# Patient Record
Sex: MALE | Race: OTHER | HISPANIC OR LATINO | Employment: FULL TIME | URBAN - METROPOLITAN AREA
[De-identification: names, ages, dates, MRNs, and addresses within clinical notes are randomized per-mention and may not be internally consistent; named-entity substitution may affect disease eponyms.]

---

## 2023-08-06 ENCOUNTER — HOSPITAL ENCOUNTER (EMERGENCY)
Facility: HOSPITAL | Age: 38
Discharge: HOME | End: 2023-08-06
Attending: STUDENT IN AN ORGANIZED HEALTH CARE EDUCATION/TRAINING PROGRAM
Payer: COMMERCIAL

## 2023-08-06 VITALS
SYSTOLIC BLOOD PRESSURE: 143 MMHG | RESPIRATION RATE: 18 BRPM | TEMPERATURE: 98.5 F | DIASTOLIC BLOOD PRESSURE: 84 MMHG | OXYGEN SATURATION: 98 % | HEART RATE: 94 BPM | WEIGHT: 280 LBS | HEIGHT: 71 IN | BODY MASS INDEX: 39.2 KG/M2

## 2023-08-06 DIAGNOSIS — G43.909 MIGRAINE: Primary | ICD-10-CM

## 2023-08-06 PROCEDURE — 96372 THER/PROPH/DIAG INJ SC/IM: CPT

## 2023-08-06 PROCEDURE — 99284 EMERGENCY DEPT VISIT MOD MDM: CPT | Performed by: STUDENT IN AN ORGANIZED HEALTH CARE EDUCATION/TRAINING PROGRAM

## 2023-08-06 PROCEDURE — 99282 EMERGENCY DEPT VISIT SF MDM: CPT

## 2023-08-06 RX ORDER — KETOROLAC TROMETHAMINE 30 MG/ML
15 INJECTION, SOLUTION INTRAMUSCULAR; INTRAVENOUS ONCE
Status: COMPLETED | OUTPATIENT
Start: 2023-08-06 | End: 2023-08-06

## 2023-08-06 RX ORDER — ACETAMINOPHEN 325 MG/1
975 TABLET ORAL ONCE
Status: COMPLETED | OUTPATIENT
Start: 2023-08-06 | End: 2023-08-06

## 2023-08-06 RX ADMIN — ACETAMINOPHEN 975 MG: 325 TABLET ORAL at 17:52

## 2023-08-06 RX ADMIN — KETOROLAC TROMETHAMINE 15 MG: 30 INJECTION, SOLUTION INTRAMUSCULAR at 17:52

## 2023-08-06 NOTE — ED PROVIDER NOTES
History  Chief Complaint   Patient presents with   • Migraine     Migraine since this morning, did not take any meds      Patient is a 37-year-old male, past medical history including migraines, who presents to the emergency department for a migraine. Started this morning. Starts around his right eye and moves back and down his neck. Similar to prior migraines. Has tried Motrin earlier this a.m. without relief. Pain is also worse with light. No other modifying factors. No associated symptoms. Denies any dizziness. No neck pain or stiffness. No chest pain or shortness of breath. No other complaints or concerns. None       Past Medical History:   Diagnosis Date   • Psychiatric disorder        No past surgical history on file. No family history on file. I have reviewed and agree with the history as documented. E-Cigarette/Vaping     E-Cigarette/Vaping Substances     Social History     Tobacco Use   • Smoking status: Never   • Smokeless tobacco: Never   Substance Use Topics   • Alcohol use: Never   • Drug use: Never       Review of Systems   Constitutional: Negative for chills and fever. Eyes: Positive for photophobia. Gastrointestinal: Negative for nausea and vomiting. Neurological: Positive for headaches. All other systems reviewed and are negative. Physical Exam  Physical Exam  Vitals and nursing note reviewed. Constitutional:       General: He is not in acute distress. Appearance: He is well-developed. He is not diaphoretic. HENT:      Head: Normocephalic and atraumatic. Right Ear: External ear normal.      Left Ear: External ear normal.      Nose: Nose normal.   Eyes:      General: Lids are normal. No scleral icterus. Cardiovascular:      Rate and Rhythm: Normal rate and regular rhythm. Heart sounds: Normal heart sounds. No murmur heard. No friction rub. No gallop. Pulmonary:      Effort: Pulmonary effort is normal. No respiratory distress. Breath sounds: Normal breath sounds. No wheezing or rales. Abdominal:      Palpations: Abdomen is soft. Tenderness: There is no abdominal tenderness. There is no guarding or rebound. Musculoskeletal:         General: No deformity. Normal range of motion. Cervical back: Normal range of motion and neck supple. Skin:     General: Skin is warm and dry. Neurological:      General: No focal deficit present. Mental Status: He is alert and oriented to person, place, and time. GCS: GCS eye subscore is 4. GCS verbal subscore is 5. GCS motor subscore is 6. Cranial Nerves: Cranial nerves 2-12 are intact. No cranial nerve deficit or facial asymmetry. Sensory: Sensation is intact. No sensory deficit. Motor: Motor function is intact. No pronator drift. Coordination: Coordination is intact. Finger-Nose-Finger Test normal. Rapid alternating movements normal.      Gait: Gait is intact.  Gait normal.   Psychiatric:         Mood and Affect: Mood normal.         Behavior: Behavior normal.         Vital Signs  ED Triage Vitals   Temperature Pulse Respirations Blood Pressure SpO2   08/06/23 1737 08/06/23 1739 08/06/23 1739 08/06/23 1737 08/06/23 1739   98.5 °F (36.9 °C) 94 18 143/84 98 %      Temp Source Heart Rate Source Patient Position - Orthostatic VS BP Location FiO2 (%)   08/06/23 1737 08/06/23 1739 08/06/23 1737 08/06/23 1737 --   Tympanic Monitor Sitting Right arm       Pain Score       08/06/23 1737       5           Vitals:    08/06/23 1737 08/06/23 1739   BP: 143/84    Pulse:  94   Patient Position - Orthostatic VS: Sitting          Visual Acuity      ED Medications  Medications   acetaminophen (TYLENOL) tablet 975 mg (975 mg Oral Given 8/6/23 1752)   ketorolac (TORADOL) injection 15 mg (15 mg Intramuscular Given 8/6/23 1752)       Diagnostic Studies  Results Reviewed     None                 No orders to display              Procedures  Procedures         ED Course  ED Course as of 08/06/23 1842   Sun Aug 06, 2023   2074 Patient reevaluated. Resting comfortably. States pain is improved to a 3 out of 10. Offered further pain medications but patient declined. As there is no indication for further work-up or treatment in the emergency department at this time will discharge. Recommended neurology follow-up. Number provided. Return precautions discussed. Patient verbalized understanding and agreed to plan of care. Medical Decision Making  Patient is a 41 y.o. male who presents to the ED for a headache. Patient is nontoxic, well-appearing. Vitals are stable. Physical exam is unremarkable. Presentation most consistent with migraine headache. Differential diagnosis includes tension type headache. No headache red flags. Neurologic exam without evidence of meningismus or focal neurologic findings. Presentation not consistent with acute SAH (lack of risk factors, has headache history). Presentation not consistent with acute CNS infection, including meningitis or brain abscess. Presentation not consistent with temporal arteritis or acute angle closure glaucoma. Presentation not consistent with other acute, emergent causes of headache at this time. No indication for imaging/LP at this time. Plan: pain medication, serial reassessment, likely d/c                 Portions of the record may have been created with voice recognition software. Occasional wrong word or "sound a like" substitutions may have occurred due to the inherent limitations of voice recognition software. Read the chart carefully and recognize, using context, where substitutions have occurred. Risk  OTC drugs. Prescription drug management.           Disposition  Final diagnoses:   Migraine     Time reflects when diagnosis was documented in both MDM as applicable and the Disposition within this note     Time User Action Codes Description Comment    8/6/2023  6:34 PM Paige Valderrama [address] [D60.922] Migraine       ED Disposition     ED Disposition   Discharge    Condition   Stable    Date/Time   Sun Aug 6, 2023  6:34 PM    [address] discharge to home/self care. Follow-up Information     Follow up With Specialties Details Why Contact Info Additional Information    Infolink  Call in 1 day  120 [facility] Emergency Department Emergency Medicine   [address] [address]  1060 [facility] Emergency Department, [address] [facility] [address] , [facility], 2303 [facility] Schedule an appointment as soon as possible for a visit   [address] [address]  [facility], [address] [facility], [address] [phone]          Patient's Medications    No medications on file       No discharge procedures on file.     PDMP Review     None          ED Provider  Electronically Signed by           José Anne DO  08/06/23 0097

## 2023-08-06 NOTE — DISCHARGE INSTRUCTIONS
You have been evaluated in the Emergency Department today for headache. Your evaluation did not show evidence of medical conditions requiring emergent intervention at this time, and your pain improved with medication in the ED. You can take ibuprofen every 6 hours or tylenol every 6 hours as needed for pain. Please follow up with your primary care physician within two days. It is also recommended follow-up with neurology. Call to schedule appointment    Return to the Emergency Department if you experience worsening or uncontrolled pain, vision changes, recurrent vomiting, difficulty with normal activities, abnormal behavior, difficulty walking, numbness, weakness, or any other concerning symptoms.

## 2023-08-06 NOTE — Clinical Note
Pat Bob was seen and treated in our emergency department on 8/6/2023. No restrictions            Diagnosis:     Michelle Reyna  may return to work on return date. He may return on this date: 08/07/2023         If you have any questions or concerns, please don't hesitate to call.       Eliseo Cabral, DO    ______________________________           _______________          _______________  Hospital Representative                              Date                                Time

## 2023-08-14 ENCOUNTER — HOSPITAL ENCOUNTER (EMERGENCY)
Facility: HOSPITAL | Age: 38
Discharge: HOME | End: 2023-08-14
Attending: STUDENT IN AN ORGANIZED HEALTH CARE EDUCATION/TRAINING PROGRAM
Payer: COMMERCIAL

## 2023-08-14 VITALS
RESPIRATION RATE: 22 BRPM | HEART RATE: 96 BPM | SYSTOLIC BLOOD PRESSURE: 127 MMHG | DIASTOLIC BLOOD PRESSURE: 83 MMHG | TEMPERATURE: 97.7 F | OXYGEN SATURATION: 96 %

## 2023-08-14 DIAGNOSIS — R51.9 HEADACHE: Primary | ICD-10-CM

## 2023-08-14 PROCEDURE — 96375 TX/PRO/DX INJ NEW DRUG ADDON: CPT

## 2023-08-14 PROCEDURE — 99284 EMERGENCY DEPT VISIT MOD MDM: CPT | Performed by: STUDENT IN AN ORGANIZED HEALTH CARE EDUCATION/TRAINING PROGRAM

## 2023-08-14 PROCEDURE — NC001 PR NO CHARGE: Performed by: STUDENT IN AN ORGANIZED HEALTH CARE EDUCATION/TRAINING PROGRAM

## 2023-08-14 PROCEDURE — 96365 THER/PROPH/DIAG IV INF INIT: CPT

## 2023-08-14 PROCEDURE — 99282 EMERGENCY DEPT VISIT SF MDM: CPT

## 2023-08-14 PROCEDURE — 96361 HYDRATE IV INFUSION ADD-ON: CPT

## 2023-08-14 RX ORDER — METOCLOPRAMIDE HYDROCHLORIDE 5 MG/ML
10 INJECTION INTRAMUSCULAR; INTRAVENOUS ONCE
Status: COMPLETED | OUTPATIENT
Start: 2023-08-14 | End: 2023-08-14

## 2023-08-14 RX ORDER — ESCITALOPRAM OXALATE 20 MG/1
20 TABLET ORAL DAILY
COMMUNITY

## 2023-08-14 RX ORDER — KETOROLAC TROMETHAMINE 30 MG/ML
15 INJECTION, SOLUTION INTRAMUSCULAR; INTRAVENOUS ONCE
Status: COMPLETED | OUTPATIENT
Start: 2023-08-14 | End: 2023-08-14

## 2023-08-14 RX ORDER — MAGNESIUM SULFATE HEPTAHYDRATE 40 MG/ML
2 INJECTION, SOLUTION INTRAVENOUS ONCE
Status: COMPLETED | OUTPATIENT
Start: 2023-08-14 | End: 2023-08-14

## 2023-08-14 RX ORDER — DIPHENHYDRAMINE HYDROCHLORIDE 50 MG/ML
25 INJECTION INTRAMUSCULAR; INTRAVENOUS ONCE
Status: COMPLETED | OUTPATIENT
Start: 2023-08-14 | End: 2023-08-14

## 2023-08-14 RX ADMIN — DIPHENHYDRAMINE HYDROCHLORIDE 25 MG: 50 INJECTION, SOLUTION INTRAMUSCULAR; INTRAVENOUS at 18:15

## 2023-08-14 RX ADMIN — MAGNESIUM SULFATE HEPTAHYDRATE 2 G: 40 INJECTION, SOLUTION INTRAVENOUS at 18:43

## 2023-08-14 RX ADMIN — SODIUM CHLORIDE 1000 ML: 0.9 INJECTION, SOLUTION INTRAVENOUS at 18:12

## 2023-08-14 RX ADMIN — KETOROLAC TROMETHAMINE 15 MG: 30 INJECTION, SOLUTION INTRAMUSCULAR at 18:14

## 2023-08-14 RX ADMIN — DIPHENHYDRAMINE HYDROCHLORIDE 25 MG: 50 INJECTION, SOLUTION INTRAMUSCULAR; INTRAVENOUS at 18:42

## 2023-08-14 RX ADMIN — METOCLOPRAMIDE 10 MG: 5 INJECTION, SOLUTION INTRAMUSCULAR; INTRAVENOUS at 18:15

## 2023-08-14 NOTE — ED NOTES
Pt reported feeling anxious and jittery. MD made aware, HR WNL, resp is non labored. To give another benadryl.       Satinder Barnard RN  08/14/23 4192

## 2023-08-14 NOTE — Clinical Note
Ann Luciana was seen and treated in our emergency department on 8/14/2023. No restrictions            Diagnosis:     Valetta Heimlich  may return to work on return date. He may return on this date: 08/16/2023         If you have any questions or concerns, please don't hesitate to call.       Gregoria Gonzalez, DO    ______________________________           _______________          _______________  Hospital Representative                              Date                                Time

## 2023-08-14 NOTE — DISCHARGE INSTRUCTIONS
You have been evaluated in the Emergency Department today for headache. Your evaluation did not show evidence of medical conditions requiring emergent intervention at this time, and your pain improved with medication in the ED. You can take ibuprofen every 6 hours or tylenol every 6 hours as needed for pain. Please follow up with your primary care physician within two days. It is also recommended you follow up with neurology. Please call to schedule an appointment. Return to the Emergency Department if you experience worsening or uncontrolled pain, vision changes, recurrent vomiting, difficulty with normal activities, abnormal behavior, difficulty walking, numbness, weakness, or any other concerning symptoms.

## 2023-08-14 NOTE — Clinical Note
Krupa Ramos was seen and treated in our emergency department on 8/14/2023. No restrictions            Diagnosis:     Balaji Nagel  may return to work on return date. He may return on this date: 08/16/2023         If you have any questions or concerns, please don't hesitate to call.       Myra Cedillo, DO    ______________________________           _______________          _______________  Hospital Representative                              Date                                Time

## 2023-08-15 NOTE — ED PROVIDER NOTES
History  Chief Complaint   Patient presents with   • Headache     Here about a week ago for migraine. States seemed to get better and back today     Patient is a 59-year-old male, past medical history of headaches, who presents to the emergency department for a headache. Started this morning. States the pain is all over. Identical to his prior headaches. Not thunderclap. Has tried Tylenol without relief. No other modifying factors. Associated with photophobia and nausea. States he was seen here recently for an identical headache and the pain ultimately resolved after that visit. Has not followed up with neurology. No fevers or chills. No other complaints or concerns. Prior to Admission Medications   Prescriptions Last Dose Informant Patient Reported? Taking? FLUTICASONE FUROATE IN   Yes Yes   Sig: Inhale if needed   Nutritional Supplements (VITAMIN D BOOSTER PO)   Yes Yes   Sig: Take by mouth in the morning   escitalopram (LEXAPRO) 20 mg tablet   Yes Yes   Sig: Take 20 mg by mouth daily      Facility-Administered Medications: None       Past Medical History:   Diagnosis Date   • Psychiatric disorder        History reviewed. No pertinent surgical history. History reviewed. No pertinent family history. I have reviewed and agree with the history as documented. E-Cigarette/Vaping     E-Cigarette/Vaping Substances     Social History     Tobacco Use   • Smoking status: Never   • Smokeless tobacco: Never   Substance Use Topics   • Alcohol use: Never   • Drug use: Never       Review of Systems   Constitutional: Negative for chills and fever. Eyes: Positive for photophobia. Neurological: Positive for headaches. Negative for dizziness. All other systems reviewed and are negative. Physical Exam  Physical Exam  Vitals and nursing note reviewed. Constitutional:       General: He is not in acute distress. Appearance: He is well-developed. He is not diaphoretic.    HENT:      Head: Normocephalic and atraumatic. Right Ear: External ear normal.      Left Ear: External ear normal.      Nose: Nose normal.   Eyes:      General: Lids are normal. No scleral icterus. Cardiovascular:      Rate and Rhythm: Normal rate and regular rhythm. Heart sounds: Normal heart sounds. No murmur heard. No friction rub. No gallop. Pulmonary:      Effort: Pulmonary effort is normal. No respiratory distress. Breath sounds: Normal breath sounds. No wheezing or rales. Abdominal:      Palpations: Abdomen is soft. Tenderness: There is no abdominal tenderness. There is no guarding or rebound. Musculoskeletal:         General: No deformity. Normal range of motion. Cervical back: Normal range of motion and neck supple. Skin:     General: Skin is warm and dry. Neurological:      General: No focal deficit present. Mental Status: He is alert.    Psychiatric:         Mood and Affect: Mood normal.         Behavior: Behavior normal.         Vital Signs  ED Triage Vitals [08/14/23 1731]   Temperature Pulse Respirations Blood Pressure SpO2   97.7 °F (36.5 °C) 96 22 127/83 96 %      Temp Source Heart Rate Source Patient Position - Orthostatic VS BP Location FiO2 (%)   Tympanic Monitor Sitting Left arm --      Pain Score       10 - Worst Possible Pain           Vitals:    08/14/23 1731   BP: 127/83   Pulse: 96   Patient Position - Orthostatic VS: Sitting         Visual Acuity      ED Medications  Medications   ketorolac (TORADOL) injection 15 mg (15 mg Intravenous Given 8/14/23 1814)   metoclopramide (REGLAN) injection 10 mg (10 mg Intravenous Given 8/14/23 1815)   diphenhydrAMINE (BENADRYL) injection 25 mg (25 mg Intravenous Given 8/14/23 1815)   magnesium sulfate 2 g/50 mL IVPB (premix) 2 g (0 g Intravenous Stopped 8/14/23 1957)   sodium chloride 0.9 % bolus 1,000 mL (0 mL Intravenous Stopped 8/14/23 2009)   diphenhydrAMINE (BENADRYL) injection 25 mg (25 mg Intravenous Given 8/14/23 1842) Diagnostic Studies  Results Reviewed     None                 No orders to display              Procedures  Procedures         ED Course  ED Course as of 08/14/23 2045   [facility] Aug 14, 2023   1920 Patient reevaluated. Resting comfortably. States pain is improved to a 2 out of 10. States he would like to go home. As there is no indication for further work-up or treatment in the emergency department at this time will discharge. Discussed neurology follow-up soon as possible given recurrence of his headaches. Return precautions discussed. Patient verbalized understanding and agreed to plan of care. Medical Decision Making  Patient is a 41 y.o. male who presents to the ED for a headache. Patient is nontoxic, well-appearing. Vitals are stable. Physical exam is unremarkable. Presentation most consistent with migraine. Differential diagnosis includes tension type headache. No headache red flags. Neurologic exam without evidence of meningismus or focal neurologic findings. Presentation not consistent with acute SAH (lack of risk factors, has headache history). Presentation not consistent with acute CNS infection, including meningitis or brain abscess. Presentation not consistent with temporal arteritis or acute angle closure glaucoma. Presentation not consistent with other acute, emergent causes of headache at this time. No indication for imaging/LP at this time. Plan: pain medication, serial reassessment, likely d/c                 Portions of the record may have been created with voice recognition software. Occasional wrong word or "sound a like" substitutions may have occurred due to the inherent limitations of voice recognition software. Read the chart carefully and recognize, using context, where substitutions have occurred. Headache: acute illness or injury  Risk  Prescription drug management.           Disposition  Final diagnoses:   Headache     Time reflects when diagnosis was documented in both MDM as applicable and the Disposition within this note     Time User Action Codes Description Comment    8/14/2023  7:26 PM Angeles Campos Add [R51.9] Headache       ED Disposition     ED Disposition   Discharge    Condition   Stable    Date/Time   Mon Aug 14, 2023  7:25 PM    [address] discharge to home/self care. Follow-up Information     Follow up With Specialties Details Why Contact Info Additional Information    Infolink  Call in 1 day  [facility] Emergency Department Emergency Medicine   [address] [address]  1060 [facility] Emergency Department, [address] [facility] [address] [place] [address], 2303 [facility] Neurology Schedule an appointment as soon as possible for a visit   [address] [address]  [facility] Neurology [address] [place] [address] Rd, [address] [facility], [address] [facility]          Discharge Medication List as of 8/14/2023  7:29 PM      CONTINUE these medications which have NOT CHANGED    Details   escitalopram (LEXAPRO) 20 mg tablet Take 20 mg by mouth daily, Historical Med      FLUTICASONE FUROATE IN Inhale if needed, Historical Med      Nutritional Supplements (VITAMIN D BOOSTER PO) Take by mouth in the morning, Historical Med             No discharge procedures on file.     PDMP Review     None          ED Provider  Electronically Signed by           Raina Dean DO  08/14/23 2045

## 2023-08-28 ENCOUNTER — HOSPITAL ENCOUNTER (EMERGENCY)
Facility: HOSPITAL | Age: 38
Discharge: HOME | End: 2023-08-28
Attending: STUDENT IN AN ORGANIZED HEALTH CARE EDUCATION/TRAINING PROGRAM
Payer: COMMERCIAL

## 2023-08-28 ENCOUNTER — APPOINTMENT (EMERGENCY)
Dept: RADIOLOGY | Facility: HOSPITAL | Age: 38
End: 2023-08-28
Payer: COMMERCIAL

## 2023-08-28 VITALS
HEART RATE: 87 BPM | TEMPERATURE: 97.8 F | BODY MASS INDEX: 39.05 KG/M2 | OXYGEN SATURATION: 98 % | WEIGHT: 280 LBS | DIASTOLIC BLOOD PRESSURE: 90 MMHG | RESPIRATION RATE: 18 BRPM | SYSTOLIC BLOOD PRESSURE: 163 MMHG

## 2023-08-28 DIAGNOSIS — M79.642 PAIN IN BOTH HANDS: Primary | ICD-10-CM

## 2023-08-28 DIAGNOSIS — M79.641 PAIN IN BOTH HANDS: Primary | ICD-10-CM

## 2023-08-28 PROCEDURE — 73130 X-RAY EXAM OF HAND: CPT

## 2023-08-28 PROCEDURE — 99284 EMERGENCY DEPT VISIT MOD MDM: CPT | Performed by: STUDENT IN AN ORGANIZED HEALTH CARE EDUCATION/TRAINING PROGRAM

## 2023-08-28 PROCEDURE — 99283 EMERGENCY DEPT VISIT LOW MDM: CPT

## 2023-08-28 RX ORDER — NAPROXEN 500 MG/1
500 TABLET ORAL ONCE
Status: COMPLETED | OUTPATIENT
Start: 2023-08-28 | End: 2023-08-28

## 2023-08-28 RX ORDER — NAPROXEN 500 MG/1
500 TABLET ORAL 2 TIMES DAILY WITH MEALS
Qty: 14 TABLET | Refills: 0 | Status: SHIPPED | OUTPATIENT
Start: 2023-08-28 | End: 2023-09-04

## 2023-08-28 RX ADMIN — NAPROXEN 500 MG: 500 TABLET ORAL at 18:15
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
## 2023-08-28 NOTE — Clinical Note
Emily Neumann was seen and treated in our emergency department on 8/28/2023. Diagnosis:     Branden Larkin  . He may return on this date: 08/29/2023         If you have any questions or concerns, please don't hesitate to call.       Diaz Jordan, DO    ______________________________           _______________          _______________  Hospital Representative                              Date                                Time Left arm;

## 2023-08-28 NOTE — DISCHARGE INSTRUCTIONS
You were seen in the emergency department for bilateral hand pain. As discussed please follow-up with hand surgery. Call to schedule an appointment. Please take naproxen as prescribed. Return to the emergency room for any new or concerning symptoms.

## 2023-09-11 ENCOUNTER — APPOINTMENT (EMERGENCY)
Dept: RADIOLOGY | Facility: HOSPITAL | Age: 38
End: 2023-09-11
Payer: OTHER MISCELLANEOUS

## 2023-09-11 ENCOUNTER — HOSPITAL ENCOUNTER (EMERGENCY)
Facility: HOSPITAL | Age: 38
Discharge: HOME | End: 2023-09-11
Attending: EMERGENCY MEDICINE
Payer: OTHER MISCELLANEOUS

## 2023-09-11 VITALS
DIASTOLIC BLOOD PRESSURE: 72 MMHG | WEIGHT: 280 LBS | OXYGEN SATURATION: 98 % | SYSTOLIC BLOOD PRESSURE: 131 MMHG | RESPIRATION RATE: 18 BRPM | BODY MASS INDEX: 39.05 KG/M2 | HEART RATE: 93 BPM | TEMPERATURE: 98.7 F

## 2023-09-11 DIAGNOSIS — M79.671 RIGHT FOOT PAIN: Primary | ICD-10-CM

## 2023-09-11 PROCEDURE — 73630 X-RAY EXAM OF FOOT: CPT

## 2023-09-11 PROCEDURE — 99284 EMERGENCY DEPT VISIT MOD MDM: CPT | Performed by: EMERGENCY MEDICINE

## 2023-09-11 PROCEDURE — 99283 EMERGENCY DEPT VISIT LOW MDM: CPT

## 2023-09-11 RX ORDER — NAPROXEN 500 MG/1
500 TABLET ORAL ONCE
Status: COMPLETED | OUTPATIENT
Start: 2023-09-11 | End: 2023-09-11

## 2023-09-11 RX ORDER — NAPROXEN 500 MG/1
500 TABLET ORAL 2 TIMES DAILY WITH MEALS
Qty: 30 TABLET | Refills: 0 | Status: SHIPPED | OUTPATIENT
Start: 2023-09-11

## 2023-09-11 RX ADMIN — NAPROXEN 500 MG: 500 TABLET ORAL at 18:14

## 2023-09-11 NOTE — ED PROVIDER NOTES
History  Chief Complaint   Patient presents with   • Ankle Pain     Co of R ankle and bottom foot pain since 3 days ago, denies any injury/fall     72-year-old male presents to the ED for evaluation of pain to the lateral aspect of right foot over the past few days. Patient works as a  at [employer]. Patient is on his feet all day long. Patient denies any falls, trauma, or injuries. Patient came to the ED for further evaluation as the pain is increasing. Patient took some over-the-counter pain medication last night. History provided by:  Patient  Ankle Pain  Associated symptoms: no back pain and no fever        Prior to Admission Medications   Prescriptions Last Dose Informant Patient Reported? Taking? FLUTICASONE FUROATE IN   Yes No   Sig: Inhale if needed   Nutritional Supplements (VITAMIN D BOOSTER PO)   Yes No   Sig: Take by mouth in the morning   escitalopram (LEXAPRO) 20 mg tablet   Yes No   Sig: Take 20 mg by mouth daily   naproxen (Naprosyn) 500 mg tablet   No No   Sig: Take 1 tablet (500 mg total) by mouth 2 (two) times a day with meals for 7 days      Facility-Administered Medications: None       Past Medical History:   Diagnosis Date   • Psychiatric disorder     PTSD       Past Surgical History:   Procedure Laterality Date   • ANTERIOR CRUCIATE LIGAMENT REPAIR Left    • APPENDECTOMY     • SHOULDER SURGERY Left        History reviewed. No pertinent family history. I have reviewed and agree with the history as documented. E-Cigarette/Vaping     E-Cigarette/Vaping Substances     Social History     Tobacco Use   • Smoking status: Some Days     Types: Cigarettes   Substance Use Topics   • Alcohol use: Yes   • Drug use: Never       Review of Systems   Constitutional: Negative for chills and fever. HENT: Negative for ear pain and sore throat. Eyes: Negative for pain and visual disturbance. Respiratory: Negative for cough and shortness of breath.     Cardiovascular: Negative for chest pain and palpitations. Gastrointestinal: Negative for abdominal pain and vomiting. Genitourinary: Negative for dysuria and hematuria. Musculoskeletal: Positive for arthralgias. Negative for back pain. Skin: Negative for color change and rash. Neurological: Negative for seizures and syncope. All other systems reviewed and are negative. Physical Exam  Physical Exam  Vitals and nursing note reviewed. Constitutional:       General: He is not in acute distress. Appearance: He is well-developed. HENT:      Head: Normocephalic and atraumatic. Eyes:      Conjunctiva/sclera: Conjunctivae normal.   Cardiovascular:      Rate and Rhythm: Normal rate and regular rhythm. Heart sounds: No murmur heard. Pulmonary:      Effort: Pulmonary effort is normal. No respiratory distress. Breath sounds: Normal breath sounds. Abdominal:      Palpations: Abdomen is soft. Tenderness: There is no abdominal tenderness. Musculoskeletal:         General: No swelling. Cervical back: Neck supple. Comments: Pulses intact to bilateral lower extremities. Range of motion of ankle is intact to bilateral lower extremities. Tenderness to palpation noted over the right fifth metatarsal bone. No erythema, edema, or any other obvious bony deformity noted on examination of right foot   Skin:     General: Skin is warm and dry. Capillary Refill: Capillary refill takes less than 2 seconds. Neurological:      Mental Status: He is alert.    Psychiatric:         Mood and Affect: Mood normal.         Vital Signs  ED Triage Vitals [09/11/23 1733]   Temperature Pulse Respirations Blood Pressure SpO2   98.7 °F (37.1 °C) 93 18 131/72 98 %      Temp Source Heart Rate Source Patient Position - Orthostatic VS BP Location FiO2 (%)   Oral Monitor Sitting Right arm --      Pain Score       5           Vitals:    09/11/23 1733   BP: 131/72   Pulse: 93   Patient Position - Orthostatic VS: Sitting         Visual Acuity      ED Medications  Medications   naproxen (NAPROSYN) tablet 500 mg (500 mg Oral Given 9/11/23 1814)       Diagnostic Studies  Results Reviewed     None                 XR foot 3+ views RIGHT    (Results Pending)              Procedures  Procedures         ED Course                                             Medical Decision Making  Obtain x-ray of right foot  Give naproxen for pain    X-ray of the right foot shows Increased angulation noted at the neck of the right fifth metatarsal bone with concern for possible fracture. Formal radiology reading is pending. Patient placed on cam boot and discharged home with NSAIDs and follow-up to orthopedic surgery. Close return instructions given to return to the ER for any worsening symptoms. Patient agrees with discharge plan. Patient well appearing at time of discharge. Please Note: Fluency Direct voice recognition software may have been used in the creation of this document. Wrong words or sound a like substitutions may have occurred due to the inherent limitations of the voice software. Amount and/or Complexity of Data Reviewed  Radiology: ordered and independent interpretation performed. Decision-making details documented in ED Course. Details: Increased angulation noted at the neck of the right fifth metatarsal bone with concern for possible fracture. Formal radiology reading is pending. Risk  Prescription drug management. Disposition  Final diagnoses:   Right foot pain     Time reflects when diagnosis was documented in both MDM as applicable and the Disposition within this note     Time User Action Codes Description Comment    9/11/2023  6:28 PM Kole Kaye Add [L36.317] Right foot pain       ED Disposition     ED Disposition   Discharge    Condition   Stable    Date/Time   Mon Sep 11, 2023  [address] discharge to home/self care.                Follow-up Information     Follow up With Specialties Details Why Contact Info Additional Information    [facility] Orthopedic Surgery Schedule an appointment as soon as possible for a visit   [address], [place] [address] [facility] [address] [facility] [address] [facility], [address] [facility] [address], [place] [address], [facility], [address] [facility]          Discharge Medication List as of 9/11/2023  6:56 PM      CONTINUE these medications which have CHANGED    Details   naproxen (Naprosyn) 500 mg tablet Take 1 tablet (500 mg total) by mouth 2 (two) times a day with meals, Starting Mon 9/11/2023, Normal         CONTINUE these medications which have NOT CHANGED    Details   escitalopram (LEXAPRO) 20 mg tablet Take 20 mg by mouth daily, Historical Med      FLUTICASONE FUROATE IN Inhale if needed, Historical Med      Nutritional Supplements (VITAMIN D BOOSTER PO) Take by mouth in the morning, Historical Med             No discharge procedures on file.     PDMP Review     None          ED Provider  Electronically Signed by           Maureen Murrell DO  09/11/23 6739

## 2023-09-11 NOTE — Clinical Note
Kelly Chanel was seen and treated in our emergency department on 9/11/2023. Diagnosis:     Ozzy Aleman  may return to work on return date. He may return on this date: 09/14/2023         If you have any questions or concerns, please don't hesitate to call.       Angela Majano, DO    ______________________________           _______________          _______________  Hospital Representative                              Date                                Time
Samantha Michel was seen and treated in our emergency department on 9/11/2023. Diagnosis:     Cinthya Troy  may return to work on return date. He may return on this date: 09/14/2023         If you have any questions or concerns, please don't hesitate to call.       Campos Iniguez, DO    ______________________________           _______________          _______________  Hospital Representative                              Date                                Time
Impaired Gait/Need for Mobility Assisted Device

## 2023-09-15 ENCOUNTER — OFFICE VISIT (OUTPATIENT)
Age: 38
End: 2023-09-15

## 2023-09-15 VITALS
WEIGHT: 280 LBS | SYSTOLIC BLOOD PRESSURE: 148 MMHG | HEART RATE: 109 BPM | BODY MASS INDEX: 39.2 KG/M2 | DIASTOLIC BLOOD PRESSURE: 87 MMHG | HEIGHT: 71 IN

## 2023-09-15 DIAGNOSIS — M72.2 PLANTAR FASCIITIS OF RIGHT FOOT: Primary | ICD-10-CM

## 2023-09-15 RX ORDER — HYDROXYZINE HYDROCHLORIDE 10 MG/1
TABLET, FILM COATED ORAL
COMMUNITY
Start: 2022-10-17 | End: 2023-10-18

## 2023-09-15 RX ORDER — SILDENAFIL 50 MG/1
TABLET, FILM COATED ORAL
COMMUNITY
Start: 2023-07-07 | End: 2024-07-07

## 2023-09-15 RX ORDER — TRAZODONE HYDROCHLORIDE 50 MG/1
1 TABLET ORAL
COMMUNITY
Start: 2023-01-10 | End: 2023-11-30

## 2023-09-15 NOTE — PROGRESS NOTES
This patient was seen on 9/15/23. My role is Foot , Ankle, and Wound Specialist    ASSESSMENT     Diagnoses and all orders for this visit:    Plantar fasciitis of right foot    Other orders  -     Cholecalciferol 50 MCG (2000 UT) TABS; 50 mcg  -     hydrOXYzine HCL (ATARAX) 10 mg tablet; TAKE ONE TABLET BY MOUTH TWICE DAILY AS NEEDED FOR ANXIETY/SLEEP  -     sildenafil (VIAGRA) 50 MG tablet; TAKE ONE TABLET BY MOUTH AS DIRECTED FOR ERECTILE DYSFUNCTION 1 HR PRIOR TO SEXUAL ACTIVITY. DO NOT TAKE MORE THAN ONE DOSE IN 24 HOURS. -     traZODone (DESYREL) 50 mg tablet; Take 1 tablet by mouth daily at bedtime as needed         Problem List Items Addressed This Visit    None  Visit Diagnoses     Plantar fasciitis of right foot    -  Primary            PLAN  -Patient was educated regarding their condition.  -Recommend purchase of night splints  -Educated patient on plantar fasciitis stretching program to be performed daily  -Recommend purchase of supportive shoes with wide toe box. Recommend purchase of OTC orthosis (superfeet, powersteps, or tread labs). -Patient will RTC in 3-weeks    -X-ray from 9/11/23 of the right foot was reviewed: No acute osseous abnormality to suggest fracture. Mild soft tissue swelling surrounding the fifth MTPJ. SUBJECTIVE    Chief Complaint:  Right foot pain     Patient ID: Keaton Lauren is a 41 y.o. male. Sally Trevino is a pleasant 53-year-old male who presents today with right foot pain. 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 He states that this pain continued for approximately the next week or so and he eventually went to the emergency department. X-rays were taken which were negative except for minor soft tissue swelling.       The following portions of the patient's history were reviewed and updated as appropriate: allergies, current medications, past family history, past medical history, past social history, past surgical history and problem list.    Review of Systems   Constitutional: Negative. Respiratory: Negative. Cardiovascular: Negative. Gastrointestinal: Negative. Genitourinary: Negative. Musculoskeletal: Positive for myalgias. OBJECTIVE      /87   Pulse (!) 109   Ht 5' 11" (1.803 m)   Wt 127 kg (280 lb)   BMI 39.05 kg/m²        Physical Exam  Constitutional:       Appearance: Normal appearance. HENT:      Head: Normocephalic and atraumatic. Eyes:      General:         Right eye: No discharge. Left eye: No discharge. Cardiovascular:      Rate and Rhythm: Normal rate and regular rhythm. Pulses:           Dorsalis pedis pulses are 2+ on the right side and 2+ on the left side. Posterior tibial pulses are 2+ on the right side and 2+ on the left side. Pulmonary:      Effort: Pulmonary effort is normal.      Breath sounds: Normal breath sounds. Skin:     General: Skin is warm. Capillary Refill: Capillary refill takes less than 2 seconds. Neurological:      Mental Status: He is alert and oriented to person, place, and time. Sensory: Sensation is intact. No sensory deficit.    Psychiatric:         Mood and Affect: Mood normal.         MSK:  -Pain on palpation of medial calcaneal tubercle at the insertion site of the plantar fascia  -no pain with compression of both sides of heel  -No gross deformities noted  -Active range of motion lesser digits intact  -MMT 5/5 to all muscle compartments of the lower extremity  -Ankle dorsiflexion is less than 10 degrees with knee extended, and knee flexed    Derm:  -No lesions, abrasions, or open wounds noted  -No noted interdigital maceration, peeling, malodor  -No areas of callus formation noted on exam  -no erythema, ecchymosis, edema noted     Vascular:  -DP and PT pulses intact b/l  -Capillary refill time <2 sec b/l  -Skin temp: WNL    Neuro:  -Light sensation intact bilaterally  -Protective sensation intact bilaterally  -Tinel sign negative

## 2023-09-15 NOTE — LETTER
September 15, 2023     Patient: Abhinav Ray  YOB: 1985  Date of Visit: 9/15/2023      To Whom it May Concern:    Abhinav Ray is under my professional care. Armani Braun was seen in my office on 9/15/2023. Armani Braun may return to work on 09/17/2023 . If you have any questions or concerns, please don't hesitate to call.          Sincerely,          Elmira Jacobsen DPM

## 2023-09-15 NOTE — PATIENT INSTRUCTIONS
-Purchase a supportive pair of sneakers such as Steven Joseph, Rodolfo, New Balance. Purchase an over the counter pair of inserts such as superfeet, tread labs, powersteps  -Purchase a night splint.

## 2023-09-18 ENCOUNTER — HOSPITAL ENCOUNTER (EMERGENCY)
Facility: HOSPITAL | Age: 38
Discharge: HOME | End: 2023-09-18
Attending: EMERGENCY MEDICINE | Admitting: EMERGENCY MEDICINE
Payer: OTHER MISCELLANEOUS

## 2023-09-18 VITALS
TEMPERATURE: 97.5 F | BODY MASS INDEX: 39.2 KG/M2 | WEIGHT: 280 LBS | DIASTOLIC BLOOD PRESSURE: 89 MMHG | HEIGHT: 71 IN | OXYGEN SATURATION: 96 % | HEART RATE: 88 BPM | RESPIRATION RATE: 18 BRPM | SYSTOLIC BLOOD PRESSURE: 171 MMHG

## 2023-09-18 DIAGNOSIS — M72.2 PLANTAR FASCIITIS: Primary | ICD-10-CM

## 2023-09-18 PROCEDURE — 99284 EMERGENCY DEPT VISIT MOD MDM: CPT | Performed by: EMERGENCY MEDICINE

## 2023-09-18 PROCEDURE — 99282 EMERGENCY DEPT VISIT SF MDM: CPT

## 2023-09-18 RX ORDER — NAPROXEN 500 MG/1
500 TABLET ORAL ONCE
Status: COMPLETED | OUTPATIENT
Start: 2023-09-18 | End: 2023-09-18

## 2023-09-18 RX ORDER — NAPROXEN 500 MG/1
500 TABLET ORAL 2 TIMES DAILY WITH MEALS
Qty: 14 TABLET | Refills: 0 | Status: SHIPPED | OUTPATIENT
Start: 2023-09-18

## 2023-09-18 RX ADMIN — NAPROXEN 500 MG: 500 TABLET ORAL at 21:17

## 2023-09-18 NOTE — Clinical Note
Brigitte Yeboah was seen and treated in our emergency department on 9/18/2023. Diagnosis:     Kristen Cortez  may return to work on return date. He may return on this date: 09/19/2023         If you have any questions or concerns, please don't hesitate to call.       Connor Su MD    ______________________________           _______________          _______________  Hospital Representative                              Date                                Time

## 2023-09-19 NOTE — DISCHARGE INSTRUCTIONS
Freeze the water bottle and roll on that, do the stretches as advised by podiatry. Rest and elevate when you can. Take the anti-inflammatory as prescribed.

## 2023-09-20 NOTE — ED PROVIDER NOTES
History  Chief Complaint   Patient presents with   • Foot Pain     Left foot pain for 3 weeks, had xray here when it happened and saw podiatry on Friday. States it's more swollen today     40 yo male s/p injury to left foot 3 weeks ago c/o continued pain in bottom of left foot. He was seen here and by podiatry. Has not been taking any medicine. Has been doing stretching exercises as recommended by podiatry. Couldn't go to work yesterday or today. No fever, cough, rash, swelling. History provided by:  Patient   used: No        Prior to Admission Medications   Prescriptions Last Dose Informant Patient Reported? Taking? Cholecalciferol 50 MCG ( UT) TABS   Yes No   Si mcg   FLUTICASONE FUROATE IN   Yes No   Sig: Inhale if needed   Nutritional Supplements (VITAMIN D BOOSTER PO)   Yes No   Sig: Take by mouth in the morning   escitalopram (LEXAPRO) 20 mg tablet   Yes No   Sig: Take 20 mg by mouth daily   hydrOXYzine HCL (ATARAX) 10 mg tablet   Yes No   Sig: TAKE ONE TABLET BY MOUTH TWICE DAILY AS NEEDED FOR ANXIETY/SLEEP   naproxen (Naprosyn) 500 mg tablet   No No   Sig: Take 1 tablet (500 mg total) by mouth 2 (two) times a day with meals for 7 days   naproxen (Naprosyn) 500 mg tablet   No No   Sig: Take 1 tablet (500 mg total) by mouth 2 (two) times a day with meals   sildenafil (VIAGRA) 50 MG tablet   Yes No   Sig: TAKE ONE TABLET BY MOUTH AS DIRECTED FOR ERECTILE DYSFUNCTION 1 HR PRIOR TO SEXUAL ACTIVITY. DO NOT TAKE MORE THAN ONE DOSE IN 24 HOURS.   traZODone (DESYREL) 50 mg tablet   Yes No   Sig: Take 1 tablet by mouth daily at bedtime as needed      Facility-Administered Medications: None       Past Medical History:   Diagnosis Date   • Psychiatric disorder     PTSD       Past Surgical History:   Procedure Laterality Date   • ANTERIOR CRUCIATE LIGAMENT REPAIR Left    • APPENDECTOMY     • SHOULDER SURGERY Left        History reviewed. No pertinent family history.   I have reviewed and agree with the history as documented. E-Cigarette/Vaping   • E-Cigarette Use Former User      E-Cigarette/Vaping Substances   • Nicotine No    • THC No    • CBD No    • Flavoring No    • Other No    • Unknown No      Social History     Tobacco Use   • Smoking status: Some Days     Types: Cigarettes   Vaping Use   • Vaping Use: Former   Substance Use Topics   • Alcohol use: Yes   • Drug use: Never       Review of Systems   Constitutional: Negative for fever. Respiratory: Negative for cough. Gastrointestinal: Negative for diarrhea and vomiting. Musculoskeletal: Positive for gait problem. Left foot pain   Skin: Negative for rash and wound. Physical Exam  Physical Exam  Vitals and nursing note reviewed. Constitutional:       General: He is not in acute distress. Appearance: He is not ill-appearing. HENT:      Head: Normocephalic and atraumatic. Pulmonary:      Effort: Pulmonary effort is normal. No respiratory distress. Musculoskeletal:         General: Tenderness present. No swelling. Normal range of motion. Cervical back: Normal range of motion and neck supple. Right lower leg: No edema. Left lower leg: No edema. Comments: + ttp along sole/ball of left foot, DP palp, not red or warm   Skin:     General: Skin is warm and dry. Neurological:      General: No focal deficit present. Mental Status: He is alert and oriented to person, place, and time.    Psychiatric:         Mood and Affect: Mood normal.         Behavior: Behavior normal.         Vital Signs  ED Triage Vitals   Temperature Pulse Respirations Blood Pressure SpO2   09/18/23 2028 09/18/23 2028 09/18/23 2028 09/18/23 2030 09/18/23 2028   97.5 °F (36.4 °C) 88 18 (!) 171/89 96 %      Temp src Heart Rate Source Patient Position - Orthostatic VS BP Location FiO2 (%)   -- -- -- -- --             Pain Score       09/18/23 2028       3           Vitals:    09/18/23 2028 09/18/23 2030   BP:  (!) 171/89   Pulse: 88          Visual Acuity      ED Medications  Medications   naproxen (NAPROSYN) tablet 500 mg (500 mg Oral Given 9/18/23 2117)       Diagnostic Studies  Results Reviewed     None                 No orders to display              Procedures  Procedures         ED Course                               SBIRT 22yo+    Flowsheet Row Most Recent Value   Initial Alcohol Screen: US AUDIT-C     1. How often do you have a drink containing alcohol? 0 Filed at: 09/18/2023 2028   2. How many drinks containing alcohol do you have on a typical day you are drinking? 0 Filed at: 09/18/2023 2028   3a. Male UNDER 65: How often do you have five or more drinks on one occasion? 0 Filed at: 09/18/2023 2028   3b. FEMALE Any Age, or MALE 65+: How often do you have 4 or more drinks on one occassion? 0 Filed at: 09/18/2023 2028   Audit-C Score 0 Filed at: 09/18/2023 2028   REUBEN: How many times in the past year have you. .. Used an illegal drug or used a prescription medication for non-medical reasons? Never Filed at: 09/18/2023 2028                    Medical Decision Making  Pt. With likely plantar fasciitis. .Advised freeze water bottle and do rolling bottom of foot on that, course of anti-inflammatory and give it more time. Given work note to return. Risk  Prescription drug management. Disposition  Final diagnoses:   Plantar fasciitis     Time reflects when diagnosis was documented in both MDM as applicable and the Disposition within this note     Time User Action Codes Description Comment    4/83/8121  3:89 PM Veronica Salmeron Add [R97.8] Plantar fasciitis       ED Disposition     ED Disposition   Discharge    Condition   Stable    Date/Time   Mon Sep 18, 2023  9:14 PM    [address] discharge to home/self care.                Follow-up Information     Follow up With Specialties Details Why [address], DP Podiatry  as planned [address] [facility] [address]  [phone]            Discharge Medication List as of 9/18/2023  9:17 PM      CONTINUE these medications which have CHANGED    Details   !! naproxen (NAPROSYN) 500 mg tablet Take 1 tablet (500 mg total) by mouth 2 (two) times a day with meals, Starting Mon 9/18/2023, Normal       !! - Potential duplicate medications found. Please discuss with provider. CONTINUE these medications which have NOT CHANGED    Details   Cholecalciferol 50 MCG (2000 UT) TABS 50 mcg, Starting Fri 7/14/2023, Historical Med      escitalopram (LEXAPRO) 20 mg tablet Take 20 mg by mouth daily, Historical Med      FLUTICASONE FUROATE IN Inhale if needed, Historical Med      hydrOXYzine HCL (ATARAX) 10 mg tablet TAKE ONE TABLET BY MOUTH TWICE DAILY AS NEEDED FOR ANXIETY/SLEEP, Historical Med      !! naproxen (Naprosyn) 500 mg tablet Take 1 tablet (500 mg total) by mouth 2 (two) times a day with meals, Starting Mon 9/11/2023, Normal      Nutritional Supplements (VITAMIN D BOOSTER PO) Take by mouth in the morning, Historical Med      sildenafil (VIAGRA) 50 MG tablet TAKE ONE TABLET BY MOUTH AS DIRECTED FOR ERECTILE DYSFUNCTION 1 HR PRIOR TO SEXUAL ACTIVITY. DO NOT TAKE MORE THAN ONE DOSE IN 24 HOURS., Historical Med      traZODone (DESYREL) 50 mg tablet Take 1 tablet by mouth daily at bedtime as needed, Starting Tue 1/10/2023, Until Thu 11/30/2023 at 2359, Historical Med       !! - Potential duplicate medications found. Please discuss with provider. No discharge procedures on file.     PDMP Review     None          ED Provider  Electronically Signed by           Kathryn Melgar MD  28/55/85 1292

## 2023-11-14 ENCOUNTER — TELEPHONE (OUTPATIENT)
Age: 38
End: 2023-11-14

## 2023-11-14 NOTE — TELEPHONE ENCOUNTER
Patient accidentally calling in to our urology office to schedule apt for testing positive for covid. Patient was made aware that he would have to follow up with his family doctor regarding this issue as we are a specialty practice for urology. He wanted assistance on looking for a PCP in his area. He was transferred to PCP office to establish care. If he cannot get into an apt with them he will go to the nearest urgent care of ER if symptoms worsen.

## 2023-11-16 ENCOUNTER — HOSPITAL ENCOUNTER (EMERGENCY)
Facility: HOSPITAL | Age: 38
Discharge: HOME | End: 2023-11-16
Attending: EMERGENCY MEDICINE
Payer: COMMERCIAL

## 2023-11-16 VITALS
HEART RATE: 87 BPM | WEIGHT: 291.2 LBS | SYSTOLIC BLOOD PRESSURE: 150 MMHG | TEMPERATURE: 97.1 F | RESPIRATION RATE: 20 BRPM | OXYGEN SATURATION: 99 % | BODY MASS INDEX: 40.61 KG/M2 | DIASTOLIC BLOOD PRESSURE: 82 MMHG

## 2023-11-16 DIAGNOSIS — R51.9 HEADACHE: Primary | ICD-10-CM

## 2023-11-16 DIAGNOSIS — U07.1 COVID-19: ICD-10-CM

## 2023-11-16 LAB
FLUAV RNA RESP QL NAA+PROBE: NEGATIVE
FLUBV RNA RESP QL NAA+PROBE: NEGATIVE
RSV RNA RESP QL NAA+PROBE: NEGATIVE
SARS-COV-2 RNA RESP QL NAA+PROBE: POSITIVE

## 2023-11-16 PROCEDURE — 0241U HB NFCT DS VIR RESP RNA 4 TRGT: CPT | Performed by: EMERGENCY MEDICINE

## 2023-11-16 PROCEDURE — 99283 EMERGENCY DEPT VISIT LOW MDM: CPT

## 2023-11-16 PROCEDURE — 99284 EMERGENCY DEPT VISIT MOD MDM: CPT | Performed by: EMERGENCY MEDICINE

## 2023-11-16 RX ORDER — FLUTICASONE PROPIONATE 50 MCG
1 SPRAY, SUSPENSION (ML) NASAL DAILY
Qty: 16 G | Refills: 0 | Status: SHIPPED | OUTPATIENT
Start: 2023-11-16

## 2023-11-16 RX ORDER — METOCLOPRAMIDE 10 MG/1
10 TABLET ORAL ONCE
Status: COMPLETED | OUTPATIENT
Start: 2023-11-16 | End: 2023-11-16

## 2023-11-16 RX ORDER — METOCLOPRAMIDE 10 MG/1
10 TABLET ORAL EVERY 6 HOURS
Qty: 10 TABLET | Refills: 0 | Status: SHIPPED | OUTPATIENT
Start: 2023-11-16 | End: 2023-11-19

## 2023-11-16 RX ORDER — FLUTICASONE PROPIONATE 50 MCG
1 SPRAY, SUSPENSION (ML) NASAL DAILY
Status: DISCONTINUED | OUTPATIENT
Start: 2023-11-16 | End: 2023-11-16 | Stop reason: HOSPADM

## 2023-11-16 RX ORDER — BUTALBITAL, ASPIRIN, AND CAFFEINE 325; 50; 40 MG/1; MG/1; MG/1
1 CAPSULE ORAL EVERY 4 HOURS PRN
Qty: 12 CAPSULE | Refills: 0 | Status: SHIPPED | OUTPATIENT
Start: 2023-11-16 | End: 2023-11-19

## 2023-11-16 RX ADMIN — METOCLOPRAMIDE 10 MG: 10 TABLET ORAL at 11:33

## 2023-11-16 RX ADMIN — FLUTICASONE PROPIONATE 1 SPRAY: 50 SPRAY, METERED NASAL at 11:33

## 2023-11-16 NOTE — Clinical Note
Nohemy Larose was seen and treated in our emergency department on 11/16/2023. Diagnosis:     Francisco Bernstein  may return to work on return date. He may return on this date: 11/20/2023         If you have any questions or concerns, please don't hesitate to call.       Niki Noyola, DO    ______________________________           _______________          _______________  Hospital Representative                              Date                                Time

## 2023-11-16 NOTE — ED PROVIDER NOTES
History  Chief Complaint   Patient presents with    Headache     Patient tested positive for covid on . States has headache and sinus congestion. Took Tylenol 1 hour ago      61-year-old male presents with headache sinus pain sinus pressure also tested positive for COVID few days ago denies any nausea vomiting fevers chills has some nasal congestion denies any cough this of breath or any other symptoms      History provided by:  Patient   used: No        Prior to Admission Medications   Prescriptions Last Dose Informant Patient Reported? Taking? Cholecalciferol 50 MCG ( UT) TABS   Yes No   Si mcg   FLUTICASONE FUROATE IN   Yes No   Sig: Inhale if needed   Nutritional Supplements (VITAMIN D BOOSTER PO)   Yes No   Sig: Take by mouth in the morning   escitalopram (LEXAPRO) 20 mg tablet   Yes No   Sig: Take 20 mg by mouth daily   hydrOXYzine HCL (ATARAX) 10 mg tablet   Yes No   Sig: TAKE ONE TABLET BY MOUTH TWICE DAILY AS NEEDED FOR ANXIETY/SLEEP   naproxen (NAPROSYN) 500 mg tablet   No No   Sig: Take 1 tablet (500 mg total) by mouth 2 (two) times a day with meals   naproxen (Naprosyn) 500 mg tablet   No No   Sig: Take 1 tablet (500 mg total) by mouth 2 (two) times a day with meals for 7 days   naproxen (Naprosyn) 500 mg tablet   No No   Sig: Take 1 tablet (500 mg total) by mouth 2 (two) times a day with meals   sildenafil (VIAGRA) 50 MG tablet   Yes No   Sig: TAKE ONE TABLET BY MOUTH AS DIRECTED FOR ERECTILE DYSFUNCTION 1 HR PRIOR TO SEXUAL ACTIVITY. DO NOT TAKE MORE THAN ONE DOSE IN 24 HOURS.   traZODone (DESYREL) 50 mg tablet   Yes No   Sig: Take 1 tablet by mouth daily at bedtime as needed      Facility-Administered Medications: None       Past Medical History:   Diagnosis Date    Psychiatric disorder     PTSD       Past Surgical History:   Procedure Laterality Date    ANTERIOR CRUCIATE LIGAMENT REPAIR Left     APPENDECTOMY      SHOULDER SURGERY Left        History reviewed.  No pertinent family history. I have reviewed and agree with the history as documented. E-Cigarette/Vaping    E-Cigarette Use Former User      E-Cigarette/Vaping Substances    Nicotine No     THC No     CBD No     Flavoring No     Other No     Unknown No      Social History     Tobacco Use    Smoking status: Some Days     Types: Cigarettes   Vaping Use    Vaping Use: Former   Substance Use Topics    Alcohol use: Yes    Drug use: Never       Review of Systems   Constitutional: Negative. HENT:  Positive for congestion, sinus pressure and sinus pain. Eyes: Negative. Respiratory: Negative. Cardiovascular: Negative. Gastrointestinal: Negative. Endocrine: Negative. Genitourinary: Negative. Musculoskeletal: Negative. Skin: Negative. Allergic/Immunologic: Negative. Neurological:  Positive for headaches. Hematological: Negative. Psychiatric/Behavioral: Negative. All other systems reviewed and are negative. Physical Exam  Physical Exam  Vitals and nursing note reviewed. Constitutional:       Appearance: Normal appearance. HENT:      Head: Normocephalic and atraumatic. Nose: Nose normal.      Mouth/Throat:      Mouth: Mucous membranes are moist.   Eyes:      Extraocular Movements: Extraocular movements intact. Pupils: Pupils are equal, round, and reactive to light. Cardiovascular:      Rate and Rhythm: Normal rate and regular rhythm. Pulmonary:      Effort: Pulmonary effort is normal.      Breath sounds: Normal breath sounds. Abdominal:      General: Abdomen is flat. Bowel sounds are normal.      Palpations: Abdomen is soft. Musculoskeletal:         General: Normal range of motion. Cervical back: Normal range of motion and neck supple. Skin:     General: Skin is warm. Capillary Refill: Capillary refill takes less than 2 seconds. Neurological:      General: No focal deficit present.       Mental Status: He is alert and oriented to person, place, and time. Mental status is at baseline. Cranial Nerves: No cranial nerve deficit. Sensory: No sensory deficit. Motor: No weakness. Coordination: Coordination normal.      Gait: Gait normal.      Deep Tendon Reflexes: Reflexes normal.   Psychiatric:         Mood and Affect: Mood normal.         Thought Content: Thought content normal.         Vital Signs  ED Triage Vitals [11/16/23 1113]   Temperature Pulse Respirations Blood Pressure SpO2   (!) 97.1 °F (36.2 °C) 87 20 150/82 99 %      Temp Source Heart Rate Source Patient Position - Orthostatic VS BP Location FiO2 (%)   Tympanic Monitor Sitting Left arm --      Pain Score       6           Vitals:    11/16/23 1113   BP: 150/82   Pulse: 87   Patient Position - Orthostatic VS: Sitting         Visual Acuity      ED Medications  Medications   metoclopramide (REGLAN) tablet 10 mg (10 mg Oral Given 11/16/23 1133)       Diagnostic Studies  Results Reviewed       Procedure Component Value Units Date/Time    FLU/RSV/COVID - if FLU/RSV clinically relevant [[id number]]  (Abnormal) Collected: 11/16/23 1135    Lab Status: Final result Specimen: Nares from Nose Updated: 11/16/23 1224     SARS-CoV-2 Positive     INFLUENZA A PCR Negative     INFLUENZA B PCR Negative     RSV PCR Negative    Narrative:      FOR PEDIATRIC PATIENTS - copy/paste COVID Guidelines URL to browser: [email or web address]    SARS-CoV-2 assay is a Nucleic Acid Amplification assay intended for the  qualitative detection of nucleic acid from SARS-CoV-2 in nasopharyngeal  swabs. Results are for the presumptive identification of SARS-CoV-2 RNA. Positive results are indicative of infection with SARS-CoV-2, the virus  causing COVID-19, but do not rule out bacterial infection or co-infection  with other viruses.  Laboratories within the [facility] and its  territories are required to report all positive results to the appropriate  public health authorities. Negative results do not preclude SARS-CoV-2  infection and should not be used as the sole basis for treatment or other  patient management decisions. Negative results must be combined with  clinical observations, patient history, and epidemiological information. This test has not been FDA cleared or approved. This test has been authorized by FDA under an Emergency Use Authorization  (EUA). This test is only authorized for the duration of time the  declaration that circumstances exist justifying the authorization of the  emergency use of an in vitro diagnostic tests for detection of SARS-CoV-2  virus and/or diagnosis of COVID-19 infection under section 564(b)(1) of  the Act, 21 U. S.C. 719LYU-2(R)(5), unless the authorization is terminated  or revoked sooner. The test has been validated but independent review by FDA  and CLIA is pending. Test performed using [employer] GeneXpert: This RT-PCR assay targets N2,  a region unique to SARS-CoV-2. A conserved region in the E-gene was chosen  for pan-Sarbecovirus detection which includes SARS-CoV-2. According to CMS-2020-01-R, this platform meets the definition of high-throughput technology. No orders to display              Procedures  Procedures         ED Course                               SBIRT 20yo+      Flowsheet Row Most Recent Value   Initial Alcohol Screen: US AUDIT-C     1. How often do you have a drink containing alcohol? 0 Filed at: 11/16/2023 1117   Audit-C Score 0 Filed at: 11/16/2023 1117   REUBEN: How many times in the past year have you. .. Used an illegal drug or used a prescription medication for non-medical reasons? Never Filed at: 11/16/2023 1117                      Medical Decision Making  Patient evaluated with labs   I reviewed the results and discussed them with the patient. Patient discharged with appropriate instructions medications and follow-up.   Patient verbalized understanding had no further questions at the time of discharge. Patient had stable vital signs and well-appearing at the time of discharge. Problems Addressed:  COVID-19: acute illness or injury  Headache: acute illness or injury    Amount and/or Complexity of Data Reviewed  External Data Reviewed: notes. Labs:  Decision-making details documented in ED Course. Risk  Prescription drug management. Disposition  Final diagnoses:   Headache   COVID-19     Time reflects when diagnosis was documented in both MDM as applicable and the Disposition within this note       Time User Action Codes Description Comment    11/16/2023 11:19 AM Sendy Noyola Add [R51.9] Headache     11/16/2023 11:19 AM Sendy Noyola Add [U07.1] COVID-19           ED Disposition       ED Disposition   Discharge    Condition   Stable    Date/Time   Thu Nov 16, 2023 [address] discharge to home/self care.                    Follow-up Information       Follow up With Specialties Details Why Contact Info Additional Information    [address] Emergency Department Emergency Medicine  If symptoms worsen [address] [address]  [address] [facility] Emergency Department, [address] [facility] [address] [place] [address], [id number]            Discharge Medication List as of 11/16/2023 11:20 AM        START taking these medications    Details   butalbital-aspirin-caffeine [facility]) -40 mg capsule Take 1 capsule by mouth every 4 (four) hours as needed for headaches for up to 3 days, Starting u 11/16/2023, Until Sun 11/19/2023 at 2359, Normal      fluticasone (FLONASE) 50 mcg/act nasal spray 1 spray into each nostril daily, Starting Thu 11/16/2023, Normal      metoclopramide (REGLAN) 10 mg tablet Take 1 tablet (10 mg total) by mouth every 6 (six) hours for 3 days, Starting u 11/16/2023, Until Sun 11/19/2023, Normal           CONTINUE these medications which have NOT CHANGED    Details Cholecalciferol 50 MCG (2000 UT) TABS 50 mcg, Starting Fri 7/14/2023, Historical Med      escitalopram (LEXAPRO) 20 mg tablet Take 20 mg by mouth daily, Historical Med      FLUTICASONE FUROATE IN Inhale if needed, Historical Med      hydrOXYzine HCL (ATARAX) 10 mg tablet TAKE ONE TABLET BY MOUTH TWICE DAILY AS NEEDED FOR ANXIETY/SLEEP, Historical Med      !! naproxen (Naprosyn) 500 mg tablet Take 1 tablet (500 mg total) by mouth 2 (two) times a day with meals, Starting Mon 9/11/2023, Normal      !! naproxen (NAPROSYN) 500 mg tablet Take 1 tablet (500 mg total) by mouth 2 (two) times a day with meals, Starting Mon 9/18/2023, Normal      Nutritional Supplements (VITAMIN D BOOSTER PO) Take by mouth in the morning, Historical Med      sildenafil (VIAGRA) 50 MG tablet TAKE ONE TABLET BY MOUTH AS DIRECTED FOR ERECTILE DYSFUNCTION 1 HR PRIOR TO SEXUAL ACTIVITY. DO NOT TAKE MORE THAN ONE DOSE IN 24 HOURS., Historical Med      traZODone (DESYREL) 50 mg tablet Take 1 tablet by mouth daily at bedtime as needed, Starting Tue 1/10/2023, Until Thu 11/30/2023 at 2359, Historical Med       !! - Potential duplicate medications found. Please discuss with provider. No discharge procedures on file.     PDMP Review       None            ED Provider  Electronically Signed by             Jose J Estrella DO  11/16/23 [id number]

## 2023-12-19 ENCOUNTER — HOSPITAL ENCOUNTER (EMERGENCY)
Facility: HOSPITAL | Age: 38
Discharge: HOME | End: 2023-12-19
Attending: EMERGENCY MEDICINE
Payer: COMMERCIAL

## 2023-12-19 VITALS
DIASTOLIC BLOOD PRESSURE: 67 MMHG | RESPIRATION RATE: 18 BRPM | OXYGEN SATURATION: 98 % | WEIGHT: 287 LBS | BODY MASS INDEX: 40.03 KG/M2 | HEART RATE: 70 BPM | TEMPERATURE: 97.8 F | SYSTOLIC BLOOD PRESSURE: 127 MMHG

## 2023-12-19 DIAGNOSIS — G43.009 MIGRAINE WITHOUT AURA AND WITHOUT STATUS MIGRAINOSUS, NOT INTRACTABLE: Primary | ICD-10-CM

## 2023-12-19 LAB
FLUAV RNA RESP QL NAA+PROBE: NEGATIVE
FLUBV RNA RESP QL NAA+PROBE: NEGATIVE
RSV RNA RESP QL NAA+PROBE: NEGATIVE
SARS-COV-2 RNA RESP QL NAA+PROBE: NEGATIVE

## 2023-12-19 PROCEDURE — 99283 EMERGENCY DEPT VISIT LOW MDM: CPT

## 2023-12-19 PROCEDURE — 96365 THER/PROPH/DIAG IV INF INIT: CPT

## 2023-12-19 PROCEDURE — 0241U HB NFCT DS VIR RESP RNA 4 TRGT: CPT | Performed by: PHYSICIAN ASSISTANT

## 2023-12-19 PROCEDURE — 96372 THER/PROPH/DIAG INJ SC/IM: CPT

## 2023-12-19 PROCEDURE — 96361 HYDRATE IV INFUSION ADD-ON: CPT

## 2023-12-19 PROCEDURE — 96375 TX/PRO/DX INJ NEW DRUG ADDON: CPT

## 2023-12-19 PROCEDURE — 99284 EMERGENCY DEPT VISIT MOD MDM: CPT | Performed by: PHYSICIAN ASSISTANT

## 2023-12-19 RX ORDER — MAGNESIUM SULFATE HEPTAHYDRATE 40 MG/ML
2 INJECTION, SOLUTION INTRAVENOUS ONCE
Status: COMPLETED | OUTPATIENT
Start: 2023-12-19 | End: 2023-12-19

## 2023-12-19 RX ORDER — KETOROLAC TROMETHAMINE 30 MG/ML
30 INJECTION, SOLUTION INTRAMUSCULAR; INTRAVENOUS ONCE
Status: COMPLETED | OUTPATIENT
Start: 2023-12-19 | End: 2023-12-19

## 2023-12-19 RX ORDER — SUMATRIPTAN 6 MG/.5ML
6 INJECTION, SOLUTION SUBCUTANEOUS ONCE
Status: COMPLETED | OUTPATIENT
Start: 2023-12-19 | End: 2023-12-19

## 2023-12-19 RX ORDER — DIPHENHYDRAMINE HYDROCHLORIDE 50 MG/ML
25 INJECTION INTRAMUSCULAR; INTRAVENOUS ONCE
Status: COMPLETED | OUTPATIENT
Start: 2023-12-19 | End: 2023-12-19

## 2023-12-19 RX ORDER — BUTALBITAL, ACETAMINOPHEN AND CAFFEINE 50; 325; 40 MG/1; MG/1; MG/1
1 TABLET ORAL EVERY 4 HOURS PRN
Qty: 30 TABLET | Refills: 0 | Status: SHIPPED | OUTPATIENT
Start: 2023-12-19 | End: 2024-01-18

## 2023-12-19 RX ORDER — METOCLOPRAMIDE HYDROCHLORIDE 5 MG/ML
10 INJECTION INTRAMUSCULAR; INTRAVENOUS ONCE
Status: DISCONTINUED | OUTPATIENT
Start: 2023-12-19 | End: 2023-12-19 | Stop reason: HOSPADM

## 2023-12-19 RX ADMIN — SODIUM CHLORIDE 1500 ML: 0.9 INJECTION, SOLUTION INTRAVENOUS at 12:54

## 2023-12-19 RX ADMIN — KETOROLAC TROMETHAMINE 30 MG: 30 INJECTION, SOLUTION INTRAMUSCULAR; INTRAVENOUS at 12:56

## 2023-12-19 RX ADMIN — SUMATRIPTAN 6 MG: 6 INJECTION, SOLUTION SUBCUTANEOUS at 14:05

## 2023-12-19 RX ADMIN — MAGNESIUM SULFATE HEPTAHYDRATE 2 G: 40 INJECTION, SOLUTION INTRAVENOUS at 12:58

## 2023-12-19 RX ADMIN — DIPHENHYDRAMINE HYDROCHLORIDE 25 MG: 50 INJECTION, SOLUTION INTRAMUSCULAR; INTRAVENOUS at 12:56

## 2023-12-19 NOTE — Clinical Note
Elver Hanks was seen and treated in our emergency department on 12/19/2023.                Diagnosis:     Elver  may return to work on return date.    He may return on this date: 12/20/2023         If you have any questions or concerns, please don't hesitate to call.      Ester Merida PA-C    ______________________________           _______________          _______________  Hospital Representative                              Date                                Time

## 2023-12-21 NOTE — ED PROVIDER NOTES
History  Chief Complaint   Patient presents with    Headache     States he started at 2 am with right neck and head pain. [facility], has never seen neurologist and would like to be seen by a neurologist but unable to find one. Took Excedrin for the pain. States no vomiting. Both legs feel weak.      Patient is a 38-year-old male with past medical history significant for migraine headache who presents for evaluation of headache.  Patient states that the pain starts on the right side of his head and radiates down the right side of his neck.  The pain is dull and throbbing.  He endorses associated photophobia and nausea.  He describes the pain as 10 out of 10.  He has taken Excedrin without improvement.  The pain is constant and unchanged.  It is similar to previous episodes of migraine headache.  He has not noted any factors that improve his pain.  His pain is worsened by neck movement, light and sound.  He denies fever, chills, vomiting, upper respiratory infection, facial pain, cough, diarrhea, dizziness, earache.  He follows with the [facility], his provider told him he should follow-up with a neurologist and he requests referral.      Headache  Associated symptoms: neck stiffness and photophobia    Associated symptoms: no abdominal pain, no back pain, no cough, no ear pain, no eye pain, no fever, no seizures, no sore throat and no vomiting        Prior to Admission Medications   Prescriptions Last Dose Informant Patient Reported? Taking?   Cholecalciferol 50 MCG (2000) TABS   Yes No   Si mcg   FLUTICASONE FUROATE IN   Yes No   Sig: Inhale if needed   Nutritional Supplements (VITAMIN D BOOSTER PO)   Yes No   Sig: Take by mouth in the morning   escitalopram (LEXAPRO) 20 mg tablet   Yes No   Sig: Take 20 mg by mouth daily   fluticasone (FLONASE) 50 mcg/act nasal spray   No No   Si spray into each nostril daily   hydrOXYzine HCL (ATARAX) 10 mg tablet   Yes No   Sig: TAKE  ONE TABLET BY MOUTH TWICE DAILY AS NEEDED FOR ANXIETY/SLEEP   naproxen (NAPROSYN) 500 mg tablet   No No   Sig: Take 1 tablet (500 mg total) by mouth 2 (two) times a day with meals   naproxen (Naprosyn) 500 mg tablet   No No   Sig: Take 1 tablet (500 mg total) by mouth 2 (two) times a day with meals for 7 days   naproxen (Naprosyn) 500 mg tablet   No No   Sig: Take 1 tablet (500 mg total) by mouth 2 (two) times a day with meals   sildenafil (VIAGRA) 50 MG tablet   Yes No   Sig: TAKE ONE TABLET BY MOUTH AS DIRECTED FOR ERECTILE DYSFUNCTION 1 HR PRIOR TO SEXUAL ACTIVITY. DO NOT TAKE MORE THAN ONE DOSE IN 24 HOURS.   traZODone (DESYREL) 50 mg tablet   Yes No   Sig: Take 1 tablet by mouth daily at bedtime as needed      Facility-Administered Medications: None       Past Medical History:   Diagnosis Date    Psychiatric disorder     PTSD       Past Surgical History:   Procedure Laterality Date    ANTERIOR CRUCIATE LIGAMENT REPAIR Left     APPENDECTOMY      SHOULDER SURGERY Left        History reviewed. No pertinent family history.  I have reviewed and agree with the history as documented.    E-Cigarette/Vaping    E-Cigarette Use Former User      E-Cigarette/Vaping Substances    Nicotine No     THC No     CBD No     Flavoring No     Other No     Unknown No      Social History     Tobacco Use    Smoking status: Former     Types: Cigarettes   Vaping Use    Vaping status: Former   Substance Use Topics    Alcohol use: Yes    Drug use: Never       Review of Systems   Constitutional: Negative.  Negative for chills and fever.   HENT: Negative.  Negative for ear pain and sore throat.    Eyes:  Positive for photophobia. Negative for pain and visual disturbance.   Respiratory: Negative.  Negative for cough and shortness of breath.    Cardiovascular: Negative.  Negative for chest pain and palpitations.   Gastrointestinal: Negative.  Negative for abdominal pain and vomiting.   Endocrine: Negative.    Genitourinary: Negative.  Negative  for dysuria and hematuria.   Musculoskeletal:  Positive for neck stiffness. Negative for arthralgias and back pain.   Skin: Negative.  Negative for color change and rash.   Allergic/Immunologic: Negative.    Neurological:  Positive for headaches. Negative for seizures and syncope.   Hematological: Negative.    Psychiatric/Behavioral: Negative.     All other systems reviewed and are negative.      Physical Exam  Physical Exam  Vitals and nursing note reviewed.   Constitutional:       General: He is not in acute distress.     Appearance: Normal appearance. He is well-developed.   HENT:      Head: Normocephalic and atraumatic.      Nose: Nose normal.      Mouth/Throat:      Mouth: Mucous membranes are moist.   Eyes:      Conjunctiva/sclera: Conjunctivae normal.      Pupils: Pupils are equal, round, and reactive to light.   Cardiovascular:      Rate and Rhythm: Regular rhythm. Tachycardia present.      Pulses: Normal pulses.      Heart sounds: Normal heart sounds. No murmur heard.  Pulmonary:      Effort: Pulmonary effort is normal. No respiratory distress.      Breath sounds: Normal breath sounds.   Abdominal:      General: Abdomen is flat. Bowel sounds are normal.      Palpations: Abdomen is soft.      Tenderness: There is no abdominal tenderness. There is no right CVA tenderness or left CVA tenderness.   Musculoskeletal:         General: No swelling or tenderness. Normal range of motion.      Cervical back: Normal range of motion and neck supple. No rigidity.      Right lower leg: No edema.   Skin:     General: Skin is warm and dry.      Capillary Refill: Capillary refill takes less than 2 seconds.   Neurological:      General: No focal deficit present.      Mental Status: He is alert and oriented to person, place, and time.      Cranial Nerves: No cranial nerve deficit.      Sensory: No sensory deficit.      Motor: No weakness.      Coordination: Coordination normal.      Gait: Gait normal.      Deep Tendon  Reflexes: Reflexes normal.   Psychiatric:         Mood and Affect: Mood normal.         Behavior: Behavior normal.         Vital Signs  ED Triage Vitals [12/19/23 1201]   Temperature Pulse Respirations Blood Pressure SpO2   97.5 °F (36.4 °C) (!) 107 20 136/86 97 %      Temp Source Heart Rate Source Patient Position - Orthostatic VS BP Location FiO2 (%)   Tympanic Monitor Sitting Left arm --      Pain Score       8           Vitals:    12/19/23 1201 12/19/23 1410   BP: 136/86 127/67   Pulse: (!) 107 70   Patient Position - Orthostatic VS: Sitting Lying         Visual Acuity      ED Medications  Medications   ketorolac (TORADOL) injection 30 mg (30 mg Intravenous Given 12/19/23 1256)   diphenhydrAMINE (BENADRYL) injection 25 mg (25 mg Intravenous Given 12/19/23 1256)   magnesium sulfate 2 g/50 mL IVPB (premix) 2 g (0 g Intravenous Stopped 12/19/23 1352)   sodium chloride 0.9 % bolus 1,500 mL (0 mL Intravenous Stopped 12/19/23 1523)   SUMAtriptan (IMITREX) subcutaneous injection 6 mg (6 mg Subcutaneous Given 12/19/23 1405)       Diagnostic Studies  Results Reviewed       Procedure Component Value Units Date/Time    COVID/FLU/RSV [[id number]]  (Normal) Collected: 12/19/23 1253    Lab Status: Final result Specimen: Nares from Nose Updated: 12/19/23 1345     SARS-CoV-2 Negative     INFLUENZA A PCR Negative     INFLUENZA B PCR Negative     RSV PCR Negative    Narrative:      FOR PEDIATRIC PATIENTS - copy/paste COVID Guidelines URL to browser: [email or web address]    SARS-CoV-2 assay is a Nucleic Acid Amplification assay intended for the  qualitative detection of nucleic acid from SARS-CoV-2 in nasopharyngeal  swabs. Results are for the presumptive identification of SARS-CoV-2 RNA.    Positive results are indicative of infection with SARS-CoV-2, the virus  causing COVID-19, but do not rule out bacterial infection or co-infection  with other viruses. Laboratories within  the [place] and its  territories are required to report all positive results to the appropriate  public health authorities. Negative results do not preclude SARS-CoV-2  infection and should not be used as the sole basis for treatment or other  patient management decisions. Negative results must be combined with  clinical observations, patient history, and epidemiological information.  This test has not been FDA cleared or approved.    This test has been authorized by FDA under an Emergency Use Authorization  (EUA). This test is only authorized for the duration of time the  declaration that circumstances exist justifying the authorization of the  emergency use of an in vitro diagnostic tests for detection of SARS-CoV-2  virus and/or diagnosis of COVID-19 infection under section 564(b)(1) of  the Act, 21 U.S.C. 360bbb-3(b)(1), unless the authorization is terminated  or revoked sooner. The test has been validated but independent review by FDA  and CLIA is pending.    Test performed using [employer] GeneXpert: This RT-PCR assay targets N2,  a region unique to SARS-CoV-2. A conserved region in the E-gene was chosen  for pan-Sarbecovirus detection which includes SARS-CoV-2.    According to CMS-2020-01-R, this platform meets the definition of high-throughput technology.                   No orders to display              Procedures  Procedures         ED Course                               SBIRT 22yo+      Flowsheet Row Most Recent Value   Initial Alcohol Screen: US AUDIT-C     1. How often do you have a drink containing alcohol? 0 Filed at: 12/19/2023 1203   Audit-C Score 0 Filed at: 12/19/2023 1203   REUBEN: How many times in the past year have you...    Used an illegal drug or used a prescription medication for non-medical reasons? Never Filed at: 12/19/2023 1203                      Medical Decision Making  Problems Addressed:  Migraine without aura and without status migrainosus, not intractable: acute illness or  injury     Details: Patient with acute migraine  Migraine terminated with Imitrex, Toradol, Benadryl, IV fluids and magnesium  Patient states that previously he did well with Fioricet-patient discharged with refill  Patient provided with referral to neurology for further evaluation  Patient educated on red flag symptoms that would necessitate return to the ED      Risk  Prescription drug management.             Disposition  Final diagnoses:   Migraine without aura and without status migrainosus, not intractable     Time reflects when diagnosis was documented in both MDM as applicable and the Disposition within this note       Time User Action Codes Description Comment    12/19/2023  3:26 PM Magnolia Ester Add [G43.009] Migraine without aura and without status migrainosus, not intractable           ED Disposition       ED Disposition   Discharge    Condition   Stable    Date/Time   Tue Dec 19, 2023 4685    Comment   Elver Hanks discharge to home/self care.                   Follow-up Information       Follow up With Specialties Details Why Contact Info Additional Information    [facility] Neurology Schedule an appointment as soon as possible for a visit   [address] [facility] [id number] [phone] [facility], [address], [place], [id number] [phone]    Primary Care Provider  Schedule an appointment as soon as possible for a visit        [facility] Emergency Department Emergency Medicine Go to  If symptoms worsen [address] [facility] [id number] [phone] [facility] Emergency Department, [address], [place], [address]            Discharge Medication List as of 12/19/2023  3:29 PM        START taking these medications    Details   butalbital-acetaminophen-caffeine (FIORICET,ESGIC) -40 mg per tablet Take 1 tablet by mouth every 4 (four)  hours as needed for headaches, Starting Tue 12/19/2023, Until Thu 1/18/2024 at 2359, Normal           CONTINUE these medications which have NOT CHANGED    Details   Cholecalciferol 50 MCG (2000 UT) TABS 50 mcg, Starting Fri 7/14/2023, Historical Med      escitalopram (LEXAPRO) 20 mg tablet Take 20 mg by mouth daily, Historical Med      fluticasone (FLONASE) 50 mcg/act nasal spray 1 spray into each nostril daily, Starting Thu 11/16/2023, Normal      FLUTICASONE FUROATE IN Inhale if needed, Historical Med      hydrOXYzine HCL (ATARAX) 10 mg tablet TAKE ONE TABLET BY MOUTH TWICE DAILY AS NEEDED FOR ANXIETY/SLEEP, Historical Med      !! naproxen (Naprosyn) 500 mg tablet Take 1 tablet (500 mg total) by mouth 2 (two) times a day with meals, Starting Mon 9/11/2023, Normal      !! naproxen (NAPROSYN) 500 mg tablet Take 1 tablet (500 mg total) by mouth 2 (two) times a day with meals, Starting Mon 9/18/2023, Normal      Nutritional Supplements (VITAMIN D BOOSTER PO) Take by mouth in the morning, Historical Med      sildenafil (VIAGRA) 50 MG tablet TAKE ONE TABLET BY MOUTH AS DIRECTED FOR ERECTILE DYSFUNCTION 1 HR PRIOR TO SEXUAL ACTIVITY. DO NOT TAKE MORE THAN ONE DOSE IN 24 HOURS., Historical Med      traZODone (DESYREL) 50 mg tablet Take 1 tablet by mouth daily at bedtime as needed, Starting Tue 1/10/2023, Until Thu 11/30/2023 at 2359, Historical Med       !! - Potential duplicate medications found. Please discuss with provider.              PDMP Review       None            ED Provider  Electronically Signed by             Ester Merida PA-C  12/21/23 2471

## 2024-04-28 ENCOUNTER — APPOINTMENT (EMERGENCY)
Dept: CT IMAGING | Facility: HOSPITAL | Age: 39
End: 2024-04-28
Payer: COMMERCIAL

## 2024-04-28 ENCOUNTER — HOSPITAL ENCOUNTER (EMERGENCY)
Facility: HOSPITAL | Age: 39
Discharge: HOME | End: 2024-04-29
Attending: EMERGENCY MEDICINE
Payer: COMMERCIAL

## 2024-04-28 DIAGNOSIS — R10.9 ABDOMINAL PAIN: Primary | ICD-10-CM

## 2024-04-28 DIAGNOSIS — K76.0 HEPATIC STEATOSIS: ICD-10-CM

## 2024-04-28 DIAGNOSIS — L81.9 HYPERPIGMENTATION OF SKIN OF CHEEK: ICD-10-CM

## 2024-04-28 LAB
ALBUMIN SERPL BCP-MCNC: 4.1 G/DL (ref 3.5–5)
ALP SERPL-CCNC: 122 U/L (ref 34–104)
ALT SERPL W P-5'-P-CCNC: 44 U/L (ref 7–52)
ANION GAP SERPL CALCULATED.3IONS-SCNC: 8 MMOL/L (ref 4–13)
AST SERPL W P-5'-P-CCNC: 23 U/L (ref 13–39)
BASOPHILS # BLD AUTO: 0.04 THOUSANDS/ÂΜL (ref 0–0.1)
BASOPHILS NFR BLD AUTO: 0 % (ref 0–1)
BILIRUB SERPL-MCNC: 0.48 MG/DL (ref 0.2–1)
BUN SERPL-MCNC: 20 MG/DL (ref 5–25)
CALCIUM SERPL-MCNC: 9.8 MG/DL (ref 8.4–10.2)
CHLORIDE SERPL-SCNC: 107 MMOL/L (ref 96–108)
CO2 SERPL-SCNC: 23 MMOL/L (ref 21–32)
CREAT SERPL-MCNC: 1.39 MG/DL (ref 0.6–1.3)
EOSINOPHIL # BLD AUTO: 0.15 THOUSAND/ÂΜL (ref 0–0.61)
EOSINOPHIL NFR BLD AUTO: 1 % (ref 0–6)
ERYTHROCYTE [DISTWIDTH] IN BLOOD BY AUTOMATED COUNT: 13.2 % (ref 11.6–15.1)
GFR SERPL CREATININE-BSD FRML MDRD: 64 ML/MIN/1.73SQ M
GLUCOSE SERPL-MCNC: 108 MG/DL (ref 65–140)
HCT VFR BLD AUTO: 41.7 % (ref 36.5–49.3)
HGB BLD-MCNC: 14.5 G/DL (ref 12–17)
IMM GRANULOCYTES # BLD AUTO: 0.09 THOUSAND/UL (ref 0–0.2)
IMM GRANULOCYTES NFR BLD AUTO: 1 % (ref 0–2)
LIPASE SERPL-CCNC: 57 U/L (ref 11–82)
LYMPHOCYTES # BLD AUTO: 3.48 THOUSANDS/ÂΜL (ref 0.6–4.47)
LYMPHOCYTES NFR BLD AUTO: 29 % (ref 14–44)
MCH RBC QN AUTO: 30.9 PG (ref 26.8–34.3)
MCHC RBC AUTO-ENTMCNC: 34.8 G/DL (ref 31.4–37.4)
MCV RBC AUTO: 89 FL (ref 82–98)
MONOCYTES # BLD AUTO: 0.84 THOUSAND/ÂΜL (ref 0.17–1.22)
MONOCYTES NFR BLD AUTO: 7 % (ref 4–12)
NEUTROPHILS # BLD AUTO: 7.58 THOUSANDS/ÂΜL (ref 1.85–7.62)
NEUTS SEG NFR BLD AUTO: 62 % (ref 43–75)
NRBC BLD AUTO-RTO: 0 /100 WBCS
PLATELET # BLD AUTO: 266 THOUSANDS/UL (ref 149–390)
PMV BLD AUTO: 9.4 FL (ref 8.9–12.7)
POTASSIUM SERPL-SCNC: 3.4 MMOL/L (ref 3.5–5.3)
PROT SERPL-MCNC: 6.8 G/DL (ref 6.4–8.4)
RBC # BLD AUTO: 4.69 MILLION/UL (ref 3.88–5.62)
SODIUM SERPL-SCNC: 138 MMOL/L (ref 135–147)
WBC # BLD AUTO: 12.18 THOUSAND/UL (ref 4.31–10.16)

## 2024-04-28 PROCEDURE — 36415 COLL VENOUS BLD VENIPUNCTURE: CPT

## 2024-04-28 PROCEDURE — 85025 COMPLETE CBC W/AUTO DIFF WBC: CPT | Performed by: EMERGENCY MEDICINE

## 2024-04-28 PROCEDURE — 80053 COMPREHEN METABOLIC PANEL: CPT | Performed by: EMERGENCY MEDICINE

## 2024-04-28 PROCEDURE — 74177 CT ABD & PELVIS W/CONTRAST: CPT

## 2024-04-28 PROCEDURE — 83690 ASSAY OF LIPASE: CPT | Performed by: EMERGENCY MEDICINE

## 2024-04-28 PROCEDURE — 99284 EMERGENCY DEPT VISIT MOD MDM: CPT

## 2024-04-28 PROCEDURE — 82533 TOTAL CORTISOL: CPT | Performed by: EMERGENCY MEDICINE

## 2024-04-28 RX ORDER — POTASSIUM CHLORIDE 20 MEQ/1
40 TABLET, EXTENDED RELEASE ORAL ONCE
Status: COMPLETED | OUTPATIENT
Start: 2024-04-29 | End: 2024-04-29

## 2024-04-28 RX ORDER — DICYCLOMINE HCL 20 MG
20 TABLET ORAL ONCE
Status: COMPLETED | OUTPATIENT
Start: 2024-04-28 | End: 2024-04-28

## 2024-04-28 RX ADMIN — DICYCLOMINE HYDROCHLORIDE 20 MG: 20 TABLET ORAL at 23:35

## 2024-04-28 NOTE — Clinical Note
Elver Hanks was seen and treated in our emergency department on 4/28/2024.                Diagnosis:     Elver  may return to work on return date.    He may return on this date: 04/30/2024         If you have any questions or concerns, please don't hesitate to call.      Alexandrea Sousa MD    ______________________________           _______________          _______________  Hospital Representative                              Date                                Time

## 2024-04-29 VITALS
WEIGHT: 288.36 LBS | BODY MASS INDEX: 40.37 KG/M2 | SYSTOLIC BLOOD PRESSURE: 120 MMHG | RESPIRATION RATE: 18 BRPM | OXYGEN SATURATION: 97 % | HEART RATE: 81 BPM | TEMPERATURE: 97.9 F | DIASTOLIC BLOOD PRESSURE: 66 MMHG | HEIGHT: 71 IN

## 2024-04-29 LAB — CORTIS SERPL-MCNC: 8.1 UG/DL

## 2024-04-29 PROCEDURE — 99284 EMERGENCY DEPT VISIT MOD MDM: CPT | Performed by: EMERGENCY MEDICINE

## 2024-04-29 RX ADMIN — POTASSIUM CHLORIDE 40 MEQ: 1500 TABLET, EXTENDED RELEASE ORAL at 00:10

## 2024-04-29 RX ADMIN — IOHEXOL 100 ML: 350 INJECTION, SOLUTION INTRAVENOUS at 00:04

## 2024-04-29 NOTE — ED PROVIDER NOTES
History  Chief Complaint   Patient presents with    Abdominal Pain     Pt presenting with abdominal pain for a few weeks and recently noticed dark spots on his cheeks. Denies N/V/D or urinary symptoms. Pt thinks it may be related to his new medications.      38-year-old male with past medical history of anxiety, depression, PTSD presents today for evaluation of generalized abdominal discomfort.  States has been occurring intermittently over the past week, worse within the past day.  Does report a decreased appetite over this time.  He also reports an unintentional 20 pound weight loss within the past month.  Denies any fevers, chills, night sweats, chest pain, shortness of breath, nausea/vomiting/diarrhea, black stools or bloody stools, urinary symptoms, back pain.  No known family history of inflammatory bowel disease or colon cancer.  He also notes areas of hyperpigmentation on his bilateral cheeks, that he noticed a few days ago.  No intraoral lesions or areas of other rashes.       Abdominal Pain  Associated symptoms: no chest pain, no chills, no cough, no dysuria, no fever, no hematuria, no shortness of breath, no sore throat and no vomiting        Prior to Admission Medications   Prescriptions Last Dose Informant Patient Reported? Taking?   Cholecalciferol 50 MCG ( UT) TABS   Yes No   Si mcg   FLUTICASONE FUROATE IN   Yes No   Sig: Inhale if needed   Nutritional Supplements (VITAMIN D BOOSTER PO)   Yes No   Sig: Take by mouth in the morning   escitalopram (LEXAPRO) 20 mg tablet   Yes No   Sig: Take 20 mg by mouth daily   fluticasone (FLONASE) 50 mcg/act nasal spray   No No   Si spray into each nostril daily   hydrOXYzine HCL (ATARAX) 10 mg tablet   Yes No   Sig: TAKE ONE TABLET BY MOUTH TWICE DAILY AS NEEDED FOR ANXIETY/SLEEP   naproxen (NAPROSYN) 500 mg tablet   No No   Sig: Take 1 tablet (500 mg total) by mouth 2 (two) times a day with meals   naproxen (Naprosyn) 500 mg tablet   No No   Sig:  Take 1 tablet (500 mg total) by mouth 2 (two) times a day with meals for 7 days   naproxen (Naprosyn) 500 mg tablet   No No   Sig: Take 1 tablet (500 mg total) by mouth 2 (two) times a day with meals   sildenafil (VIAGRA) 50 MG tablet   Yes No   Sig: TAKE ONE TABLET BY MOUTH AS DIRECTED FOR ERECTILE DYSFUNCTION 1 HR PRIOR TO SEXUAL ACTIVITY. DO NOT TAKE MORE THAN ONE DOSE IN 24 HOURS.   traZODone (DESYREL) 50 mg tablet   Yes No   Sig: Take 1 tablet by mouth daily at bedtime as needed      Facility-Administered Medications: None       Past Medical History:   Diagnosis Date    Psychiatric disorder     PTSD       Past Surgical History:   Procedure Laterality Date    ANTERIOR CRUCIATE LIGAMENT REPAIR Left     APPENDECTOMY      SHOULDER SURGERY Left        History reviewed. No pertinent family history.  I have reviewed and agree with the history as documented.    E-Cigarette/Vaping    E-Cigarette Use Former User      E-Cigarette/Vaping Substances    Nicotine No     THC No     CBD No     Flavoring No     Other No     Unknown No      Social History     Tobacco Use    Smoking status: Former     Types: Cigarettes   Vaping Use    Vaping status: Former   Substance Use Topics    Alcohol use: Yes    Drug use: Never        Review of Systems   Constitutional:  Negative for chills and fever.   HENT:  Negative for ear pain and sore throat.    Eyes:  Negative for pain and visual disturbance.   Respiratory:  Negative for cough and shortness of breath.    Cardiovascular:  Negative for chest pain and palpitations.   Gastrointestinal:  Positive for abdominal pain. Negative for vomiting.   Genitourinary:  Negative for dysuria and hematuria.   Musculoskeletal:  Negative for arthralgias and back pain.   Skin:  Positive for rash. Negative for color change.   Neurological:  Negative for seizures and syncope.   All other systems reviewed and are negative.      Physical Exam  ED Triage Vitals   Temperature Pulse Respirations Blood Pressure SpO2    04/28/24 2300 04/28/24 2258 04/28/24 2258 04/28/24 2258 04/28/24 2258   97.9 °F (36.6 °C) 90 18 147/96 98 %      Temp Source Heart Rate Source Patient Position - Orthostatic VS BP Location FiO2 (%)   04/28/24 2258 -- 04/28/24 2258 04/28/24 2258 --   Oral  Sitting Right arm       Pain Score       --                    Orthostatic Vital Signs  Vitals:    04/28/24 2258   BP: 147/96   Pulse: 90   Patient Position - Orthostatic VS: Sitting       Physical Exam  Vitals and nursing note reviewed.   Constitutional:       General: He is not in acute distress.     Appearance: He is well-developed. He is obese. He is not toxic-appearing.   HENT:      Head: Normocephalic and atraumatic.   Eyes:      Conjunctiva/sclera: Conjunctivae normal.   Cardiovascular:      Rate and Rhythm: Normal rate and regular rhythm.      Heart sounds: No murmur heard.  Pulmonary:      Effort: Pulmonary effort is normal. No respiratory distress.      Breath sounds: Normal breath sounds.   Abdominal:      Palpations: Abdomen is soft.      Tenderness: There is no abdominal tenderness. There is no guarding or rebound.   Musculoskeletal:         General: No swelling.      Cervical back: Neck supple.   Skin:     General: Skin is warm and dry.      Capillary Refill: Capillary refill takes less than 2 seconds.      Comments: Areas of hyperpigmentation to lateral aspect of bilateral cheeks    Neurological:      General: No focal deficit present.      Mental Status: He is alert.         ED Medications  Medications   dicyclomine (BENTYL) tablet 20 mg (20 mg Oral Given 4/28/24 2335)   potassium chloride (Klor-Con M20) CR tablet 40 mEq (40 mEq Oral Given 4/29/24 0010)   iohexol (OMNIPAQUE) 350 MG/ML injection (MULTI-DOSE) 100 mL (100 mL Intravenous Given 4/29/24 0004)       Diagnostic Studies  Results Reviewed       Procedure Component Value Units Date/Time    Cortisol [[id number]] Collected: 04/28/24 2307    Lab Status: In process Specimen: Blood from Arm, Right  Updated: 04/29/24 0033    Comprehensive metabolic panel [[id number]]  (Abnormal) Collected: 04/28/24 2307    Lab Status: Final result Specimen: Blood from Arm, Right Updated: 04/28/24 2332     Sodium 138 mmol/L      Potassium 3.4 mmol/L      Chloride 107 mmol/L      CO2 23 mmol/L      ANION GAP 8 mmol/L      BUN 20 mg/dL      Creatinine 1.39 mg/dL      Glucose 108 mg/dL      Calcium 9.8 mg/dL      AST 23 U/L      ALT 44 U/L      Alkaline Phosphatase 122 U/L      Total Protein 6.8 g/dL      Albumin 4.1 g/dL      Total Bilirubin 0.48 mg/dL      eGFR 64 ml/min/1.73sq m     Narrative:      National Kid[employer] Foundation guidelines for Chronic Kidney Disease (CKD):     Stage 1 with normal or high GFR (GFR > 90 mL/min/1.73 square meters)    Stage 2 Mild CKD (GFR = 60-89 mL/min/1.73 square meters)    Stage 3A Moderate CKD (GFR = 45-59 mL/min/1.73 square meters)    Stage 3B Moderate CKD (GFR = 30-44 mL/min/1.73 square meters)    Stage 4 Severe CKD (GFR = 15-29 mL/min/1.73 square meters)    Stage 5 End Stage CKD (GFR <15 mL/min/1.73 square meters)  Note: GFR calculation is accurate only with a steady state creatinine    Lipase [[id number]]  (Normal) Collected: 04/28/24 2307    Lab Status: Final result Specimen: Blood from Arm, Right Updated: 04/28/24 2332     Lipase 57 u/L     CBC and differential [[id number]]  (Abnormal) Collected: 04/28/24 2307    Lab Status: Final result Specimen: Blood from Arm, Right Updated: 04/28/24 2313     WBC 12.18 Thousand/uL      RBC 4.69 Million/uL      Hemoglobin 14.5 g/dL      Hematocrit 41.7 %      MCV 89 fL      MCH 30.9 pg      MCHC 34.8 g/dL      RDW 13.2 %      MPV 9.4 fL      Platelets 266 Thousands/uL      nRBC 0 /100 WBCs      Segmented % 62 %      Immature Grans % 1 %      Lymphocytes % 29 %      Monocytes % 7 %      Eosinophils Relative 1 %      Basophils Relative 0 %      Absolute Neutrophils 7.58 Thousands/µL      Absolute Immature Grans 0.09 Thousand/uL      Absolute Lymphocytes  3.48 Thousands/µL      Absolute Monocytes 0.84 Thousand/µL      Eosinophils Absolute 0.15 Thousand/µL      Basophils Absolute 0.04 Thousands/µL                    CT abdomen pelvis with contrast   Final Result by Larry Whitfield MD (04/29 0015)      Mild diffuse hepatic steatosis. No acute intra-abdominal/pelvic abnormalities noted with findings detailed above.            Workstation performed: [id number]               Procedures  Procedures      ED Course                                       Medical Decision Making  38-year-old male with past medical history of anxiety, depression, PTSD presents today for evaluation of generalized abdominal discomfort over the past week with areas of hyperpigmentation to bilateral cheeks over the past several days.  Labs reveal mild leukocytosis, mild hypokalemia and mild elevation in creatinine.  CT scan revealed hepatic steatosis but no other acute abnormalities.  Given areas of hyperpigmentation with mild lab abnormalities, sent random cortisol level, concern for possible adrenal insufficiency, will have patient follow-up with endocrinology as an outpatient.  Provided with GI follow-up as needed if symptoms do not improve.  Reviewed return precautions.    Amount and/or Complexity of Data Reviewed  Labs: ordered.  Radiology: ordered.    Risk  Prescription drug management.          Disposition  Final diagnoses:   Abdominal pain   Hyperpigmentation of skin of cheek   Hepatic steatosis     Time reflects when diagnosis was documented in both MDM as applicable and the Disposition within this note       Time User Action Codes Description Comment    4/29/2024  1:05 AM Alexandrea Sousa Add [R10.9] Abdominal pain     4/29/2024  1:05 AM Alexandrea Sousa Add [L81.9] Hyperpigmentation of skin of cheek     4/29/2024  1:13 AM Alexandrea Sousa Add [K76.0] Hepatic steatosis           ED Disposition       ED Disposition   Discharge    Condition   Stable    Date/Time   Mon Apr 29, 2024  1:05 AM     Comment   Elver Demario discharge to home/self care.                   Follow-up Information       Follow up With Specialties Details Why Contact Info Additional Information    [facility] Schedule an appointment as soon as possible for a visit   [address] [id number] [phone] [facility], [address] [place], [address], [phone]    Count includes the [facility] Emergency Department Emergency Medicine  As needed, If symptoms worsen [address] [facility] [address]  [phone] Count includes the [facility] Emergency Department, [address], [place], [address]    [facility] Schedule an appointment as soon as possible for a visit   [address]  [facility] [address] [phone] [facility], [address], [facility], [place], [place], [address] [phone]            Patient's Medications   Discharge Prescriptions    No medications on file     No discharge procedures on file.    PDMP Review       None             ED Provider  Attending physically available and evaluated Elver Pratherivar. I managed the patient along with the ED Attending.    Electronically Signed by           Alexandrea Sousa MD  04/29/24 0117

## 2024-04-29 NOTE — ED ATTENDING ATTESTATION
4/28/2024  I, Macario Jerez MD, saw and evaluated the patient. I have discussed the patient with the resident/non-physician practitioner and agree with the resident's/non-physician practitioner's findings, Plan of Care, and MDM as documented in the resident's/non-physician practitioner's note, except where noted. All available labs and Radiology studies were reviewed.  I was present for key portions of any procedure(s) performed by the resident/non-physician practitioner and I was immediately available to provide assistance.       At this point I agree with the current assessment done in the Emergency Department.  I have conducted an independent evaluation of this patient a history and physical is as follows:    38-year-old male presented for evaluation of few weeks of generalized abdominal discomfort.  No sharp pain.  Reports less appetite and reported a 20 pound weight loss but per our records his weight has remained relatively the same for the last few months.    Wt Readings from Last 3 Encounters:   04/28/24 131 kg (288 lb 5.8 oz)   12/19/23 130 kg (287 lb)   11/16/23 132 kg (291 lb 3.2 oz)     He also states over the last few days he has developed some darkening of his skin on the cheeks.  No nausea, vomiting, fever, chills, diarrhea, urinary symptoms.  Pain not necessarily worse or better with eating.  He has a history of anxiety, PTSD, depression.  Takes multiple medications but no recent changes.  On exam vitals are normal.  No focal abdominal tenderness or distention.    ED Course  ED Course as of 04/29/24 0044   Mon Apr 29, 2024   0031 CT shows no acute changes other than moderate hepatic steatosis.     Lab work with mild elevation in serum creatinine, mild hypokalemia, mild leukocytosis, all nonspecific findings.  CT essentially unremarkable.  Random cortisol level was sent given patient's hyperpigmentation on the face.  Also recommending further evaluation by endocrinology.  Can follow-up with PCP  otherwise.        Critical Care Time  Procedures

## 2024-04-30 ENCOUNTER — HOSPITAL ENCOUNTER (EMERGENCY)
Facility: HOSPITAL | Age: 39
Discharge: HOME | End: 2024-05-01
Attending: EMERGENCY MEDICINE | Admitting: EMERGENCY MEDICINE
Payer: COMMERCIAL

## 2024-04-30 DIAGNOSIS — R10.9 ABDOMINAL PAIN: Primary | ICD-10-CM

## 2024-04-30 LAB
BASOPHILS # BLD AUTO: 0.03 THOUSANDS/ÂΜL (ref 0–0.1)
BASOPHILS NFR BLD AUTO: 0 % (ref 0–1)
EOSINOPHIL # BLD AUTO: 0.15 THOUSAND/ÂΜL (ref 0–0.61)
EOSINOPHIL NFR BLD AUTO: 2 % (ref 0–6)
ERYTHROCYTE [DISTWIDTH] IN BLOOD BY AUTOMATED COUNT: 13 % (ref 11.6–15.1)
HCT VFR BLD AUTO: 37.4 % (ref 36.5–49.3)
HGB BLD-MCNC: 13.1 G/DL (ref 12–17)
IMM GRANULOCYTES # BLD AUTO: 0.04 THOUSAND/UL (ref 0–0.2)
IMM GRANULOCYTES NFR BLD AUTO: 0 % (ref 0–2)
LYMPHOCYTES # BLD AUTO: 3.12 THOUSANDS/ÂΜL (ref 0.6–4.47)
LYMPHOCYTES NFR BLD AUTO: 32 % (ref 14–44)
MCH RBC QN AUTO: 31.1 PG (ref 26.8–34.3)
MCHC RBC AUTO-ENTMCNC: 35 G/DL (ref 31.4–37.4)
MCV RBC AUTO: 89 FL (ref 82–98)
MONOCYTES # BLD AUTO: 0.65 THOUSAND/ÂΜL (ref 0.17–1.22)
MONOCYTES NFR BLD AUTO: 7 % (ref 4–12)
NEUTROPHILS # BLD AUTO: 5.67 THOUSANDS/ÂΜL (ref 1.85–7.62)
NEUTS SEG NFR BLD AUTO: 59 % (ref 43–75)
NRBC BLD AUTO-RTO: 0 /100 WBCS
PLATELET # BLD AUTO: 245 THOUSANDS/UL (ref 149–390)
PMV BLD AUTO: 9.5 FL (ref 8.9–12.7)
RBC # BLD AUTO: 4.21 MILLION/UL (ref 3.88–5.62)
WBC # BLD AUTO: 9.66 THOUSAND/UL (ref 4.31–10.16)

## 2024-04-30 PROCEDURE — 36415 COLL VENOUS BLD VENIPUNCTURE: CPT | Performed by: EMERGENCY MEDICINE

## 2024-04-30 PROCEDURE — 96361 HYDRATE IV INFUSION ADD-ON: CPT

## 2024-04-30 PROCEDURE — 96374 THER/PROPH/DIAG INJ IV PUSH: CPT

## 2024-04-30 PROCEDURE — 80053 COMPREHEN METABOLIC PANEL: CPT | Performed by: EMERGENCY MEDICINE

## 2024-04-30 PROCEDURE — 85025 COMPLETE CBC W/AUTO DIFF WBC: CPT | Performed by: EMERGENCY MEDICINE

## 2024-04-30 PROCEDURE — 99284 EMERGENCY DEPT VISIT MOD MDM: CPT | Performed by: EMERGENCY MEDICINE

## 2024-04-30 PROCEDURE — C9113 INJ PANTOPRAZOLE SODIUM, VIA: HCPCS | Performed by: EMERGENCY MEDICINE

## 2024-04-30 PROCEDURE — 99283 EMERGENCY DEPT VISIT LOW MDM: CPT

## 2024-04-30 RX ORDER — PANTOPRAZOLE SODIUM 40 MG/1
40 TABLET, DELAYED RELEASE ORAL DAILY
Qty: 20 TABLET | Refills: 1 | Status: SHIPPED | OUTPATIENT
Start: 2024-04-30

## 2024-04-30 RX ORDER — DICYCLOMINE HCL 20 MG
20 TABLET ORAL 4 TIMES DAILY PRN
Qty: 20 TABLET | Refills: 1 | Status: SHIPPED | OUTPATIENT
Start: 2024-04-30

## 2024-04-30 RX ORDER — PANTOPRAZOLE SODIUM 40 MG/10ML
40 INJECTION, POWDER, LYOPHILIZED, FOR SOLUTION INTRAVENOUS ONCE
Status: COMPLETED | OUTPATIENT
Start: 2024-04-30 | End: 2024-04-30

## 2024-04-30 RX ADMIN — PANTOPRAZOLE SODIUM 40 MG: 40 INJECTION, POWDER, FOR SOLUTION INTRAVENOUS at 23:36

## 2024-04-30 RX ADMIN — SODIUM CHLORIDE 1000 ML: 0.9 INJECTION, SOLUTION INTRAVENOUS at 23:36

## 2024-04-30 NOTE — Clinical Note
Elver Hanks was seen and treated in our emergency department on 4/30/2024.                Diagnosis:     Elver  may return to work on return date.    He may return on this date: 05/02/2024         If you have any questions or concerns, please don't hesitate to call.      Baylee Pace MD    ______________________________           _______________          _______________  Hospital Representative                              Date                                Time

## 2024-05-01 VITALS
TEMPERATURE: 97.8 F | OXYGEN SATURATION: 100 % | SYSTOLIC BLOOD PRESSURE: 117 MMHG | HEART RATE: 82 BPM | RESPIRATION RATE: 18 BRPM | DIASTOLIC BLOOD PRESSURE: 70 MMHG | BODY MASS INDEX: 39.44 KG/M2 | WEIGHT: 282.8 LBS

## 2024-05-01 RX ORDER — DICYCLOMINE HYDROCHLORIDE 10 MG/1
20 CAPSULE ORAL ONCE
Status: COMPLETED | OUTPATIENT
Start: 2024-05-01 | End: 2024-05-01

## 2024-05-01 RX ADMIN — DICYCLOMINE HYDROCHLORIDE 20 MG: 10 CAPSULE ORAL at 00:48

## 2024-05-01 NOTE — DISCHARGE INSTRUCTIONS
Your white blood cell is improved/normal, your kidney function is stable.  You need further testing beyond what we can do in the ER.  Please see your primary care doctor and get referral for GI.  In the meantime, try the medicines as prescribed.  You can also use tylenol and heating pad as needed.

## 2024-05-01 NOTE — ED PROVIDER NOTES
"History  Chief Complaint   Patient presents with    Abdominal Pain     States he was seen yesterday at [facility] for same. Pain \" all over abdomen \" did not go away. States vomited this am and ate at [employer] for breakfast and ate chicken and fries this evening.      39 yo male c/o generalized abdominal pain for the past week and a half.  Pain initially intermittent, now more constant.  Pain is described as a pressure.  + decreased appetite but no other associated symptoms.  No nausea, vomiting, diarrhea, constipation, urinary symptoms.  He was started on some new meds for depression, sleep and PTSD which may affect his appetite.  He is s/p appendectomy.  He was seen at [facility] 2 days ago and had tests which didn't show any cause of the pain and he says they didn't give him anything.  He says he was referred to GI but they told him he had to see PCP and get referral before they could see him.      History provided by:  Patient   used: No    Abdominal Pain  Associated symptoms: no chest pain, no chills, no cough, no diarrhea, no dysuria, no fever, no hematuria, no nausea, no shortness of breath, no sore throat and no vomiting        Prior to Admission Medications   Prescriptions Last Dose Informant Patient Reported? Taking?   Cholecalciferol 50 MCG ( UT) TABS   Yes No   Si mcg   FLUTICASONE FUROATE IN   Yes No   Sig: Inhale if needed   Nutritional Supplements (VITAMIN D BOOSTER PO)   Yes No   Sig: Take by mouth in the morning   escitalopram (LEXAPRO) 20 mg tablet   Yes No   Sig: Take 20 mg by mouth daily   fluticasone (FLONASE) 50 mcg/act nasal spray   No No   Si spray into each nostril daily   hydrOXYzine HCL (ATARAX) 10 mg tablet   Yes No   Sig: TAKE ONE TABLET BY MOUTH TWICE DAILY AS NEEDED FOR ANXIETY/SLEEP   naproxen (NAPROSYN) 500 mg tablet   No No   Sig: Take 1 tablet (500 mg total) by mouth 2 (two) times a day with meals   naproxen (Naprosyn) 500 mg tablet   No No "   Sig: Take 1 tablet (500 mg total) by mouth 2 (two) times a day with meals for 7 days   naproxen (Naprosyn) 500 mg tablet   No No   Sig: Take 1 tablet (500 mg total) by mouth 2 (two) times a day with meals   sildenafil (VIAGRA) 50 MG tablet   Yes No   Sig: TAKE ONE TABLET BY MOUTH AS DIRECTED FOR ERECTILE DYSFUNCTION 1 HR PRIOR TO SEXUAL ACTIVITY. DO NOT TAKE MORE THAN ONE DOSE IN 24 HOURS.   traZODone (DESYREL) 50 mg tablet   Yes No   Sig: Take 1 tablet by mouth daily at bedtime as needed      Facility-Administered Medications: None       Past Medical History:   Diagnosis Date    Psychiatric disorder     PTSD       Past Surgical History:   Procedure Laterality Date    ANTERIOR CRUCIATE LIGAMENT REPAIR Left     APPENDECTOMY      SHOULDER SURGERY Left        History reviewed. No pertinent family history.  I have reviewed and agree with the history as documented.    E-Cigarette/Vaping    E-Cigarette Use Former User      E-Cigarette/Vaping Substances    Nicotine No     THC No     CBD No     Flavoring No     Other No     Unknown No      Social History     Tobacco Use    Smoking status: Former     Types: Cigarettes   Vaping Use    Vaping status: Former   Substance Use Topics    Alcohol use: Yes    Drug use: Never       Review of Systems   Constitutional:  Positive for appetite change and unexpected weight change. Negative for chills and fever.   HENT: Negative.  Negative for congestion and sore throat.    Eyes: Negative.    Respiratory: Negative.  Negative for cough and shortness of breath.    Cardiovascular: Negative.  Negative for chest pain and leg swelling.   Gastrointestinal:  Positive for abdominal pain. Negative for diarrhea, nausea and vomiting.   Genitourinary: Negative.  Negative for dysuria, flank pain and hematuria.   Musculoskeletal: Negative.  Negative for back pain and myalgias.   Skin: Negative.  Negative for rash and wound.   Neurological: Negative.  Negative for dizziness and headaches.    Psychiatric/Behavioral: Negative.  Negative for confusion and hallucinations. The patient is not nervous/anxious.    All other systems reviewed and are negative.      Physical Exam  Physical Exam  Vitals and nursing note reviewed.   Constitutional:       General: He is not in acute distress.     Appearance: He is well-developed. He is not ill-appearing or diaphoretic.   HENT:      Head: Normocephalic and atraumatic.   Eyes:      General: No scleral icterus.     Conjunctiva/sclera: Conjunctivae normal.   Cardiovascular:      Rate and Rhythm: Normal rate and regular rhythm.      Heart sounds: Normal heart sounds. No murmur heard.  Pulmonary:      Effort: Pulmonary effort is normal. No respiratory distress.      Breath sounds: Normal breath sounds.   Abdominal:      General: Bowel sounds are normal. There is no distension.      Palpations: Abdomen is soft.      Tenderness: There is no abdominal tenderness. There is no right CVA tenderness or left CVA tenderness.   Musculoskeletal:         General: No deformity. Normal range of motion.      Cervical back: Normal range of motion and neck supple.      Right lower leg: No edema.      Left lower leg: No edema.   Skin:     General: Skin is warm and dry.      Coloration: Skin is not pale.      Findings: No erythema or rash.   Neurological:      General: No focal deficit present.      Mental Status: He is alert and oriented to person, place, and time.      Cranial Nerves: No cranial nerve deficit.   Psychiatric:         Mood and Affect: Mood normal.         Behavior: Behavior normal.         Vital Signs  ED Triage Vitals [04/30/24 2100]   Temperature Pulse Respirations Blood Pressure SpO2   97.8 °F (36.6 °C) 89 20 125/74 95 %      Temp Source Heart Rate Source Patient Position - Orthostatic VS BP Location FiO2 (%)   Tympanic Monitor Sitting Left arm --      Pain Score       8           Vitals:    04/30/24 2100 05/01/24 0049 05/01/24 0052   BP: 125/74 117/70 117/70   Pulse:  89  82   Patient Position - Orthostatic VS: Sitting           Visual Acuity      ED Medications  Medications   sodium chloride 0.9 % bolus 1,000 mL (0 mL Intravenous Stopped 5/1/24 0047)   pantoprazole (PROTONIX) injection 40 mg (40 mg Intravenous Given 4/30/24 2336)   dicyclomine (BENTYL) capsule 20 mg (20 mg Oral Given 5/1/24 0048)       Diagnostic Studies  Results Reviewed       Procedure Component Value Units Date/Time    Comprehensive metabolic panel [[id number]]  (Abnormal) Collected: 04/30/24 2335    Lab Status: Final result Specimen: Blood from Arm, Right Updated: 05/01/24 0003     Sodium 139 mmol/L      Potassium 3.7 mmol/L      Chloride 108 mmol/L      CO2 26 mmol/L      ANION GAP 5 mmol/L      BUN 19 mg/dL      Creatinine 1.36 mg/dL      Glucose 105 mg/dL      Calcium 9.5 mg/dL      AST 22 U/L      ALT 41 U/L      Alkaline Phosphatase 102 U/L      Total Protein 6.3 g/dL      Albumin 3.8 g/dL      Total Bilirubin 0.42 mg/dL      eGFR 66 ml/min/1.73sq m     Narrative:      National Kidney Disease Foundation guidelines for Chronic Kidney Disease (CKD):     Stage 1 with normal or high GFR (GFR > 90 mL/min/1.73 square meters)    Stage 2 Mild CKD (GFR = 60-89 mL/min/1.73 square meters)    Stage 3A Moderate CKD (GFR = 45-59 mL/min/1.73 square meters)    Stage 3B Moderate CKD (GFR = 30-44 mL/min/1.73 square meters)    Stage 4 Severe CKD (GFR = 15-29 mL/min/1.73 square meters)    Stage 5 End Stage CKD (GFR <15 mL/min/1.73 square meters)  Note: GFR calculation is accurate only with a steady state creatinine    CBC and differential [[id number]] Collected: 04/30/24 2335    Lab Status: Final result Specimen: Blood from Arm, Right Updated: 04/30/24 2350     WBC 9.66 Thousand/uL      RBC 4.21 Million/uL      Hemoglobin 13.1 g/dL      Hematocrit 37.4 %      MCV 89 fL      MCH 31.1 pg      MCHC 35.0 g/dL      RDW 13.0 %      MPV 9.5 fL      Platelets 245 Thousands/uL      nRBC 0 /100 WBCs      Segmented % 59 %      Immature  Grans % 0 %      Lymphocytes % 32 %      Monocytes % 7 %      Eosinophils Relative 2 %      Basophils Relative 0 %      Absolute Neutrophils 5.67 Thousands/µL      Absolute Immature Grans 0.04 Thousand/uL      Absolute Lymphocytes 3.12 Thousands/µL      Absolute Monocytes 0.65 Thousand/µL      Eosinophils Absolute 0.15 Thousand/µL      Basophils Absolute 0.03 Thousands/µL                    No orders to display              Procedures  Procedures         ED Course                               SBIRT 22yo+      Flowsheet Row Most Recent Value   Initial Alcohol Screen: US AUDIT-C     1. How often do you have a drink containing alcohol? 0 Filed at: 04/30/2024 2102   Audit-C Score 0 Filed at: 04/30/2024 2102   REUBEN: How many times in the past year have you...    Used an illegal drug or used a prescription medication for non-medical reasons? Never Filed at: 04/30/2024 2102                      Medical Decision Making  Visit from 2 days ago reviewed. WBC 12, Cr. 1.39, CT showed hepatic steatosis otherwise negative.    Differential in pt. With normal CT scan includes but not limited to IBS, inflammatory bowel disease, musculoskeletal pain, GERD, gastritis.  WBC improved, Cr. Stable.  Pt. Medicated with protonix and bentyl.  Will try course of those outpt. While pt. Waiting to see GI.      Amount and/or Complexity of Data Reviewed  Labs: ordered.    Risk  Prescription drug management.             Disposition  Final diagnoses:   Abdominal pain     Time reflects when diagnosis was documented in both MDM as applicable and the Disposition within this note       Time User Action Codes Description Comment    4/30/2024 11:04 PM Baylee Pace Add [R10.9] Abdominal pain           ED Disposition       ED Disposition   Discharge    Condition   Stable    Date/Time   Wed May 1, 2024 0020    Comment   Elver Hanks discharge to home/self care.                   Follow-up Information    None         Patient's Medications   Discharge  Prescriptions    DICYCLOMINE (BENTYL) 20 MG TABLET    Take 1 tablet (20 mg total) by mouth 4 (four) times a day as needed (abdominal discomfort)       Start Date: 4/30/2024 End Date: --       Order Dose: 20 mg       Quantity: 20 tablet    Refills: 1    PANTOPRAZOLE (PROTONIX) 40 MG TABLET    Take 1 tablet (40 mg total) by mouth daily       Start Date: 4/30/2024 End Date: --       Order Dose: 40 mg       Quantity: 20 tablet    Refills: 1       No discharge procedures on file.    PDMP Review       None            ED Provider  Electronically Signed by             Baylee Pace MD  05/01/24 0059

## 2024-05-08 LAB
ALBUMIN SERPL BCP-MCNC: 3.8 G/DL (ref 3.5–5)
ALP SERPL-CCNC: 102 U/L (ref 34–104)
ALT SERPL W P-5'-P-CCNC: 41 U/L (ref 7–52)
ANION GAP SERPL CALCULATED.3IONS-SCNC: 5 MMOL/L (ref 4–13)
AST SERPL W P-5'-P-CCNC: 22 U/L (ref 13–39)
BILIRUB SERPL-MCNC: 0.42 MG/DL (ref 0.2–1)
BUN SERPL-MCNC: 19 MG/DL (ref 5–25)
CALCIUM SERPL-MCNC: 9.5 MG/DL (ref 8.4–10.2)
CHLORIDE SERPL-SCNC: 108 MMOL/L (ref 96–108)
CO2 SERPL-SCNC: 26 MMOL/L (ref 21–32)
CREAT SERPL-MCNC: 1.36 MG/DL (ref 0.6–1.3)
GFR SERPL CREATININE-BSD FRML MDRD: 66 ML/MIN/1.73SQ M
GLUCOSE SERPL-MCNC: 105 MG/DL (ref 65–140)
POTASSIUM SERPL-SCNC: 3.7 MMOL/L (ref 3.5–5.3)
PROT SERPL-MCNC: 6.3 G/DL (ref 6.4–8.4)
SODIUM SERPL-SCNC: 139 MMOL/L (ref 135–147)

## 2024-05-12 ENCOUNTER — TELEPHONE (OUTPATIENT)
Dept: FAMILY MEDICINE CLINIC | Facility: CLINIC | Age: 39
End: 2024-05-12

## 2024-05-12 PROBLEM — F43.20 ADJUSTMENT DISORDER, UNSPECIFIED: Status: ACTIVE | Noted: 2024-05-12

## 2024-05-12 PROBLEM — F33.9 CHRONIC RECURRENT MAJOR DEPRESSIVE DISORDER (HCC): Status: ACTIVE | Noted: 2024-05-12

## 2024-05-12 PROBLEM — M25.562 LEFT KNEE PAIN: Status: ACTIVE | Noted: 2024-05-12

## 2024-05-12 PROBLEM — R76.11 POSITIVE REACTION TO TUBERCULIN SKIN TEST: Status: ACTIVE | Noted: 2024-05-12

## 2024-05-12 PROBLEM — M54.50 LOW BACK PAIN: Status: ACTIVE | Noted: 2024-05-12

## 2024-05-12 PROBLEM — E66.9 OBESITY: Status: ACTIVE | Noted: 2024-05-12

## 2024-05-12 PROBLEM — R79.89 ABNORMAL LIVER FUNCTION TESTS: Status: ACTIVE | Noted: 2024-05-12

## 2024-05-12 PROBLEM — F43.12 POST-TRAUMATIC STRESS DISORDER, CHRONIC: Status: ACTIVE | Noted: 2024-05-12

## 2024-05-12 PROBLEM — E55.9 VITAMIN D DEFICIENCY: Status: ACTIVE | Noted: 2024-05-12

## 2024-05-12 PROBLEM — R06.83 SNORING: Status: ACTIVE | Noted: 2024-05-12

## 2024-05-12 PROBLEM — G47.33 OBSTRUCTIVE SLEEP APNEA SYNDROME: Status: ACTIVE | Noted: 2024-05-12

## 2024-05-12 PROBLEM — R76.11 NONSPECIFIC REACTION TO TUBERCULIN TEST: Status: ACTIVE | Noted: 2024-05-12

## 2024-05-12 PROBLEM — F41.1 GENERALIZED ANXIETY DISORDER: Status: ACTIVE | Noted: 2024-05-12

## 2024-05-12 PROBLEM — F33.1 MAJOR DEPRESSIVE DISORDER, RECURRENT, MODERATE (HCC): Status: ACTIVE | Noted: 2024-05-12

## 2024-05-12 PROBLEM — F41.9 ANXIETY: Status: ACTIVE | Noted: 2024-05-12

## 2024-05-12 PROBLEM — G43.909 MIGRAINE, UNSPECIFIED, NOT INTRACTABLE, WITHOUT STATUS MIGRAINOSUS: Status: ACTIVE | Noted: 2024-05-12

## 2024-05-12 PROBLEM — S39.012A STRAIN OF LUMBAR REGION: Status: ACTIVE | Noted: 2024-05-12

## 2024-05-12 PROBLEM — G44.59 OTHER COMPLICATED HEADACHE SYNDROME: Status: ACTIVE | Noted: 2024-05-12

## 2024-05-17 PROBLEM — F33.1 MAJOR DEPRESSIVE DISORDER, RECURRENT, MODERATE (HCC): Status: RESOLVED | Noted: 2024-05-12 | Resolved: 2024-05-17

## 2024-05-17 PROBLEM — F41.1 GAD (GENERALIZED ANXIETY DISORDER): Status: RESOLVED | Noted: 2024-05-12 | Resolved: 2024-05-17

## 2024-05-17 PROBLEM — F41.1 GAD (GENERALIZED ANXIETY DISORDER): Status: ACTIVE | Noted: 2024-05-12

## 2024-05-17 PROBLEM — R79.89 ABNORMAL LIVER FUNCTION TESTS: Status: RESOLVED | Noted: 2024-05-12 | Resolved: 2024-05-17

## 2024-06-01 ENCOUNTER — TELEPHONE (OUTPATIENT)
Dept: FAMILY MEDICINE CLINIC | Facility: CLINIC | Age: 39
End: 2024-06-01

## 2024-06-04 ENCOUNTER — TELEPHONE (OUTPATIENT)
Dept: FAMILY MEDICINE CLINIC | Facility: CLINIC | Age: 39
End: 2024-06-04

## 2024-07-24 ENCOUNTER — HOSPITAL ENCOUNTER (EMERGENCY)
Facility: HOSPITAL | Age: 39
Discharge: HOME | End: 2024-07-24
Attending: EMERGENCY MEDICINE
Payer: COMMERCIAL

## 2024-07-24 VITALS
HEART RATE: 75 BPM | TEMPERATURE: 97.4 F | DIASTOLIC BLOOD PRESSURE: 85 MMHG | BODY MASS INDEX: 39.05 KG/M2 | WEIGHT: 280 LBS | OXYGEN SATURATION: 94 % | RESPIRATION RATE: 20 BRPM | SYSTOLIC BLOOD PRESSURE: 125 MMHG

## 2024-07-24 DIAGNOSIS — G43.909 MIGRAINE: Primary | ICD-10-CM

## 2024-07-24 PROCEDURE — 96361 HYDRATE IV INFUSION ADD-ON: CPT

## 2024-07-24 PROCEDURE — 99283 EMERGENCY DEPT VISIT LOW MDM: CPT

## 2024-07-24 PROCEDURE — 96375 TX/PRO/DX INJ NEW DRUG ADDON: CPT

## 2024-07-24 PROCEDURE — 99284 EMERGENCY DEPT VISIT MOD MDM: CPT | Performed by: EMERGENCY MEDICINE

## 2024-07-24 PROCEDURE — 96374 THER/PROPH/DIAG INJ IV PUSH: CPT

## 2024-07-24 RX ORDER — ONDANSETRON 2 MG/ML
4 INJECTION INTRAMUSCULAR; INTRAVENOUS ONCE
Status: COMPLETED | OUTPATIENT
Start: 2024-07-24 | End: 2024-07-24

## 2024-07-24 RX ORDER — ACETAMINOPHEN 325 MG/1
650 TABLET ORAL ONCE
Status: COMPLETED | OUTPATIENT
Start: 2024-07-24 | End: 2024-07-24

## 2024-07-24 RX ORDER — KETOROLAC TROMETHAMINE 30 MG/ML
15 INJECTION, SOLUTION INTRAMUSCULAR; INTRAVENOUS ONCE
Status: COMPLETED | OUTPATIENT
Start: 2024-07-24 | End: 2024-07-24

## 2024-07-24 RX ORDER — DIPHENHYDRAMINE HYDROCHLORIDE 50 MG/ML
25 INJECTION INTRAMUSCULAR; INTRAVENOUS ONCE
Status: COMPLETED | OUTPATIENT
Start: 2024-07-24 | End: 2024-07-24

## 2024-07-24 RX ADMIN — ACETAMINOPHEN 650 MG: 325 TABLET ORAL at 20:06

## 2024-07-24 RX ADMIN — SODIUM CHLORIDE 1000 ML: 0.9 INJECTION, SOLUTION INTRAVENOUS at 18:33

## 2024-07-24 RX ADMIN — DIPHENHYDRAMINE HYDROCHLORIDE 25 MG: 50 INJECTION, SOLUTION INTRAMUSCULAR; INTRAVENOUS at 20:07

## 2024-07-24 RX ADMIN — KETOROLAC TROMETHAMINE 15 MG: 30 INJECTION, SOLUTION INTRAMUSCULAR; INTRAVENOUS at 18:38

## 2024-07-24 RX ADMIN — ONDANSETRON 4 MG: 2 INJECTION INTRAMUSCULAR; INTRAVENOUS at 18:37

## 2024-07-24 NOTE — ED PROVIDER NOTES
History  Chief Complaint   Patient presents with    Migraine     Pt reported migraine starting at right ear. Started today, denies nausea or vomiting. Takes meds at home with no relief.      39-year-old male states that he has a migraine headache since earlier this morning right side of his head.  States this is his usual presentation denies any fevers chills no vomiting diarrhea has had some slight nausea with it.  States he usually gets a migraine cocktail here however he is allergic to Reglan.  Patient is not able to take any of his medicines today.        Prior to Admission Medications   Prescriptions Last Dose Informant Patient Reported? Taking?   Cholecalciferol 50 MCG (2000) TABS   Yes No   Si mcg   Magnesium Oxide 240 MG PACK   Yes No   Sig: Take 420 mg by mouth   Nutritional Supplements (VITAMIN D BOOSTER PO)   Yes No   Sig: Take by mouth in the morning   buPROPion (WELLBUTRIN XL) 150 mg 24 hr tablet   Yes No   Si mg   dicyclomine (BENTYL) 20 mg tablet   No No   Sig: Take 1 tablet (20 mg total) by mouth 4 (four) times a day as needed (abdominal discomfort)   escitalopram (LEXAPRO) 20 mg tablet   Yes No   Sig: Take 20 mg by mouth daily   famotidine (PEPCID) 20 mg tablet   Yes No   Sig: Take 20 mg by mouth   fluticasone (FLONASE) 50 mcg/act nasal spray   No No   Si spray into each nostril daily   hydrOXYzine HCL (ATARAX) 25 mg tablet   Yes No   ibuprofen (MOTRIN) 600 mg tablet   Yes No   melatonin 3 mg   Yes No   Si mg   methocarbamol (ROBAXIN) 500 mg tablet   Yes No   Sig: Take 500 mg by mouth   pantoprazole (PROTONIX) 40 mg tablet   No No   Sig: Take 1 tablet (40 mg total) by mouth daily   prazosin (MINIPRESS) 2 mg capsule   Yes No   Si mg   sildenafil (VIAGRA) 50 MG tablet   Yes No   Sig: TAKE ONE TABLET BY MOUTH AS DIRECTED FOR ERECTILE DYSFUNCTION 1 HR PRIOR TO SEXUAL ACTIVITY. DO NOT TAKE MORE THAN ONE DOSE IN 24 HOURS.   topiramate (TOPAMAX) 25 mg tablet   Yes No   topiramate  (TOPAMAX) 50 MG tablet   Yes No   traZODone (DESYREL) 50 mg tablet   Yes No   Sig: Take 1 tablet by mouth daily at bedtime as needed      Facility-Administered Medications: None       Past Medical History:   Diagnosis Date    Migraine     Psychiatric disorder     PTSD       Past Surgical History:   Procedure Laterality Date    ANTERIOR CRUCIATE LIGAMENT REPAIR Left     APPENDECTOMY      SHOULDER SURGERY Left        History reviewed. No pertinent family history.  I have reviewed and agree with the history as documented.    E-Cigarette/Vaping    E-Cigarette Use Former User      E-Cigarette/Vaping Substances    Nicotine No     THC No     CBD No     Flavoring No     Other No     Unknown No      Social History     Tobacco Use    Smoking status: Former     Types: Cigarettes   Vaping Use    Vaping status: Former   Substance Use Topics    Alcohol use: Yes    Drug use: Never       Review of Systems   Constitutional:  Negative for activity change, chills, diaphoresis and fever.   HENT:  Negative for congestion, ear pain, nosebleeds, sore throat, trouble swallowing and voice change.    Eyes:  Negative for pain, discharge and redness.   Respiratory:  Negative for apnea, cough, choking, shortness of breath, wheezing and stridor.    Cardiovascular:  Negative for chest pain and palpitations.   Gastrointestinal:  Negative for abdominal distention, abdominal pain, constipation, diarrhea, nausea and vomiting.   Endocrine: Negative for polydipsia.   Genitourinary:  Negative for difficulty urinating, dysuria, flank pain, frequency, hematuria and urgency.   Musculoskeletal:  Negative for back pain, gait problem, joint swelling, myalgias, neck pain and neck stiffness.   Skin:  Negative for pallor and rash.   Neurological:  Positive for headaches. Negative for dizziness, tremors, syncope, speech difficulty, weakness and numbness.   Hematological:  Negative for adenopathy.   Psychiatric/Behavioral:  Negative for confusion, hallucinations,  self-injury and suicidal ideas. The patient is not nervous/anxious.        Physical Exam  Physical Exam  Vitals and nursing note reviewed.   Constitutional:       General: He is not in acute distress.     Appearance: He is well-developed. He is not diaphoretic.   HENT:      Head: Normocephalic and atraumatic.      Right Ear: External ear normal.      Left Ear: External ear normal.      Nose: Nose normal.   Eyes:      Conjunctiva/sclera: Conjunctivae normal.      Pupils: Pupils are equal, round, and reactive to light.   Cardiovascular:      Rate and Rhythm: Normal rate and regular rhythm.      Heart sounds: Normal heart sounds.   Pulmonary:      Effort: Pulmonary effort is normal.      Breath sounds: Normal breath sounds.   Abdominal:      General: Bowel sounds are normal.      Palpations: Abdomen is soft.      Tenderness: There is no abdominal tenderness.      Comments:      Musculoskeletal:         General: Normal range of motion.      Cervical back: Normal range of motion and neck supple.   Skin:     General: Skin is warm and dry.   Neurological:      Mental Status: He is alert and oriented to person, place, and time.      Deep Tendon Reflexes: Reflexes are normal and symmetric.   Psychiatric:         Behavior: Behavior is cooperative.         Vital Signs  ED Triage Vitals [07/24/24 1748]   Temperature Pulse Respirations Blood Pressure SpO2   97.8 °F (36.6 °C) 86 20 136/89 98 %      Temp Source Heart Rate Source Patient Position - Orthostatic VS BP Location FiO2 (%)   Oral Monitor Sitting Right arm --      Pain Score       10 - Worst Possible Pain           Vitals:    07/24/24 1748 07/24/24 2031   BP: 136/89 125/85   Pulse: 86 75   Patient Position - Orthostatic VS: Sitting          Visual Acuity      ED Medications  Medications   sodium chloride 0.9 % bolus 1,000 mL (0 mL Intravenous Stopped 7/24/24 2006)   ondansetron (ZOFRAN) injection 4 mg (4 mg Intravenous Given 7/24/24 1837)   ketorolac (TORADOL) injection  15 mg (15 mg Intravenous Given 7/24/24 1838)   acetaminophen (TYLENOL) tablet 650 mg (650 mg Oral Given 7/24/24 2006)   diphenhydrAMINE (BENADRYL) injection 25 mg (25 mg Intravenous Given 7/24/24 2007)       Diagnostic Studies  Results Reviewed       None                   No orders to display              Procedures  Procedures         ED Course                                               Medical Decision Making  Risk  OTC drugs.  Prescription drug management.                 Disposition  Final diagnoses:   Migraine     Time reflects when diagnosis was documented in both MDM as applicable and the Disposition within this note       Time User Action Codes Description Comment    7/24/2024  8:33 PM Hossein Hatfield [G43.909] Migraine           ED Disposition       ED Disposition   Discharge    Condition   Stable    Date/Time   Wed Jul 24, 2024  8:33 PM    Comment   Elver Hanks discharge to home/self care.                   Follow-up Information       Follow up With Specialties Details Why Contact Info Additional Information    [facility] Emergency Medicine  As needed [address] [id number]  [phone] [facility] Emergency Department, [address], [place], [address]            Patient's Medications   Discharge Prescriptions    No medications on file       No discharge procedures on file.    PDMP Review       None            ED Provider  Electronically Signed by             Hossein Hatfield DO  07/24/24 2034

## 2024-07-24 NOTE — Clinical Note
Elver Hanks was seen and treated in our emergency department on 7/24/2024.                Diagnosis:     Elver  may return to work on return date.    He may return on this date: 07/26/2024         If you have any questions or concerns, please don't hesitate to call.      Hossein Hatfield, DO    ______________________________           _______________          _______________  Hospital Representative                              Date                                Time

## 2024-11-20 ENCOUNTER — HOSPITAL ENCOUNTER (EMERGENCY)
Facility: HOSPITAL | Age: 39
Discharge: HOME | End: 2024-11-20
Attending: EMERGENCY MEDICINE
Payer: COMMERCIAL

## 2024-11-20 VITALS
RESPIRATION RATE: 20 BRPM | HEART RATE: 80 BPM | SYSTOLIC BLOOD PRESSURE: 138 MMHG | OXYGEN SATURATION: 98 % | DIASTOLIC BLOOD PRESSURE: 99 MMHG | TEMPERATURE: 98.9 F

## 2024-11-20 DIAGNOSIS — R11.2 NAUSEA AND VOMITING: ICD-10-CM

## 2024-11-20 DIAGNOSIS — R51.9 HEADACHE: Primary | ICD-10-CM

## 2024-11-20 LAB
ALBUMIN SERPL BCG-MCNC: 4.4 G/DL (ref 3.5–5)
ALP SERPL-CCNC: 94 U/L (ref 34–104)
ALT SERPL W P-5'-P-CCNC: 86 U/L (ref 7–52)
ANION GAP SERPL CALCULATED.3IONS-SCNC: 4 MMOL/L (ref 4–13)
AST SERPL W P-5'-P-CCNC: 40 U/L (ref 13–39)
BASOPHILS # BLD AUTO: 0.04 THOUSANDS/ÂΜL (ref 0–0.1)
BASOPHILS NFR BLD AUTO: 1 % (ref 0–1)
BILIRUB SERPL-MCNC: 0.62 MG/DL (ref 0.2–1)
BUN SERPL-MCNC: 15 MG/DL (ref 5–25)
CALCIUM SERPL-MCNC: 10 MG/DL (ref 8.4–10.2)
CHLORIDE SERPL-SCNC: 107 MMOL/L (ref 96–108)
CO2 SERPL-SCNC: 28 MMOL/L (ref 21–32)
CREAT SERPL-MCNC: 1.25 MG/DL (ref 0.6–1.3)
EOSINOPHIL # BLD AUTO: 0.14 THOUSAND/ÂΜL (ref 0–0.61)
EOSINOPHIL NFR BLD AUTO: 2 % (ref 0–6)
ERYTHROCYTE [DISTWIDTH] IN BLOOD BY AUTOMATED COUNT: 13.1 % (ref 11.6–15.1)
GFR SERPL CREATININE-BSD FRML MDRD: 73 ML/MIN/1.73SQ M
GLUCOSE SERPL-MCNC: 84 MG/DL (ref 65–140)
HCT VFR BLD AUTO: 46.9 % (ref 36.5–49.3)
HGB BLD-MCNC: 15.5 G/DL (ref 12–17)
IMM GRANULOCYTES # BLD AUTO: 0.06 THOUSAND/UL (ref 0–0.2)
IMM GRANULOCYTES NFR BLD AUTO: 1 % (ref 0–2)
LIPASE SERPL-CCNC: 55 U/L (ref 11–82)
LYMPHOCYTES # BLD AUTO: 2.4 THOUSANDS/ÂΜL (ref 0.6–4.47)
LYMPHOCYTES NFR BLD AUTO: 29 % (ref 14–44)
MCH RBC QN AUTO: 30.6 PG (ref 26.8–34.3)
MCHC RBC AUTO-ENTMCNC: 33 G/DL (ref 31.4–37.4)
MCV RBC AUTO: 93 FL (ref 82–98)
MONOCYTES # BLD AUTO: 0.97 THOUSAND/ÂΜL (ref 0.17–1.22)
MONOCYTES NFR BLD AUTO: 12 % (ref 4–12)
NEUTROPHILS # BLD AUTO: 4.54 THOUSANDS/ÂΜL (ref 1.85–7.62)
NEUTS SEG NFR BLD AUTO: 55 % (ref 43–75)
NRBC BLD AUTO-RTO: 0 /100 WBCS
PLATELET # BLD AUTO: 252 THOUSANDS/UL (ref 149–390)
PMV BLD AUTO: 9 FL (ref 8.9–12.7)
POTASSIUM SERPL-SCNC: 3.8 MMOL/L (ref 3.5–5.3)
PROT SERPL-MCNC: 7.3 G/DL (ref 6.4–8.4)
RBC # BLD AUTO: 5.07 MILLION/UL (ref 3.88–5.62)
SODIUM SERPL-SCNC: 139 MMOL/L (ref 135–147)
WBC # BLD AUTO: 8.15 THOUSAND/UL (ref 4.31–10.16)

## 2024-11-20 PROCEDURE — 96361 HYDRATE IV INFUSION ADD-ON: CPT

## 2024-11-20 PROCEDURE — 99283 EMERGENCY DEPT VISIT LOW MDM: CPT

## 2024-11-20 PROCEDURE — 83690 ASSAY OF LIPASE: CPT | Performed by: EMERGENCY MEDICINE

## 2024-11-20 PROCEDURE — 85025 COMPLETE CBC W/AUTO DIFF WBC: CPT | Performed by: EMERGENCY MEDICINE

## 2024-11-20 PROCEDURE — 99284 EMERGENCY DEPT VISIT MOD MDM: CPT | Performed by: EMERGENCY MEDICINE

## 2024-11-20 PROCEDURE — 96365 THER/PROPH/DIAG IV INF INIT: CPT

## 2024-11-20 PROCEDURE — 96367 TX/PROPH/DG ADDL SEQ IV INF: CPT

## 2024-11-20 PROCEDURE — 80053 COMPREHEN METABOLIC PANEL: CPT | Performed by: EMERGENCY MEDICINE

## 2024-11-20 PROCEDURE — 96375 TX/PRO/DX INJ NEW DRUG ADDON: CPT

## 2024-11-20 PROCEDURE — 36415 COLL VENOUS BLD VENIPUNCTURE: CPT | Performed by: EMERGENCY MEDICINE

## 2024-11-20 RX ORDER — ONDANSETRON 4 MG/1
4 TABLET, ORALLY DISINTEGRATING ORAL EVERY 6 HOURS PRN
Qty: 12 TABLET | Refills: 0 | Status: SHIPPED | OUTPATIENT
Start: 2024-11-20

## 2024-11-20 RX ORDER — DIPHENHYDRAMINE HYDROCHLORIDE 50 MG/ML
25 INJECTION INTRAMUSCULAR; INTRAVENOUS ONCE
Status: COMPLETED | OUTPATIENT
Start: 2024-11-20 | End: 2024-11-20

## 2024-11-20 RX ORDER — KETOROLAC TROMETHAMINE 30 MG/ML
15 INJECTION, SOLUTION INTRAMUSCULAR; INTRAVENOUS ONCE
Status: COMPLETED | OUTPATIENT
Start: 2024-11-20 | End: 2024-11-20

## 2024-11-20 RX ORDER — ONDANSETRON 2 MG/ML
INJECTION INTRAMUSCULAR; INTRAVENOUS
Status: COMPLETED
Start: 2024-11-20 | End: 2024-11-20

## 2024-11-20 RX ORDER — MAGNESIUM SULFATE HEPTAHYDRATE 40 MG/ML
2 INJECTION, SOLUTION INTRAVENOUS ONCE
Status: COMPLETED | OUTPATIENT
Start: 2024-11-20 | End: 2024-11-20

## 2024-11-20 RX ORDER — DROPERIDOL 2.5 MG/ML
0.62 INJECTION, SOLUTION INTRAMUSCULAR; INTRAVENOUS ONCE
Status: COMPLETED | OUTPATIENT
Start: 2024-11-20 | End: 2024-11-20

## 2024-11-20 RX ORDER — ONDANSETRON 2 MG/ML
4 INJECTION INTRAMUSCULAR; INTRAVENOUS ONCE
Status: COMPLETED | OUTPATIENT
Start: 2024-11-20 | End: 2024-11-20

## 2024-11-20 RX ADMIN — SODIUM CHLORIDE 1000 ML: 0.9 INJECTION, SOLUTION INTRAVENOUS at 12:01

## 2024-11-20 RX ADMIN — DIPHENHYDRAMINE HYDROCHLORIDE 25 MG: 50 INJECTION, SOLUTION INTRAMUSCULAR; INTRAVENOUS at 11:56

## 2024-11-20 RX ADMIN — ONDANSETRON 4 MG: 2 INJECTION INTRAMUSCULAR; INTRAVENOUS at 12:09

## 2024-11-20 RX ADMIN — KETOROLAC TROMETHAMINE 15 MG: 30 INJECTION, SOLUTION INTRAMUSCULAR; INTRAVENOUS at 11:55

## 2024-11-20 RX ADMIN — VALPROATE SODIUM 1000 MG: 100 INJECTION, SOLUTION INTRAVENOUS at 13:36

## 2024-11-20 RX ADMIN — MAGNESIUM SULFATE HEPTAHYDRATE 2 G: 40 INJECTION, SOLUTION INTRAVENOUS at 12:02

## 2024-11-20 RX ADMIN — DROPERIDOL 0.62 MG: 2.5 INJECTION, SOLUTION INTRAMUSCULAR; INTRAVENOUS at 13:02

## 2024-11-20 NOTE — Clinical Note
Elver Hanks was seen and treated in our emergency department on 11/20/2024.                Diagnosis:     Elver  may return to work on return date.    He may return on this date: 11/22/2024         If you have any questions or concerns, please don't hesitate to call.      Brandy Price MD    ______________________________           _______________          _______________  Hospital Representative                              Date                                Time

## 2024-11-20 NOTE — DISCHARGE INSTRUCTIONS
Follow-up with primary care for further care. Contact info provided below if needed.  Use over the counter medications as stated on the bottle as needed for pain control.  Take your new medications as prescribed as needed for nausea and vomiting.  Stay hydrated.   Return to the ED with new or worsening symptoms.

## 2024-11-20 NOTE — ED PROVIDER NOTES
Time reflects when diagnosis was documented in both MDM as applicable and the Disposition within this note       Time User Action Codes Description Comment    11/20/2024  2:23 PM Brandy Price Add [R51.9] Headache     11/20/2024  2:23 PM Brandy Price Add [R11.2] Nausea and vomiting           ED Disposition       ED Disposition   Discharge    Condition   Stable    Date/Time   Wed Nov 20, 2024  2:23 PM    Comment   Elver Demario discharge to home/self care.                   Assessment & Plan       Medical Decision Making  Pt is a 38yo M who presents with headache, nausea, and vomiting. Exam pertinent for neuro intact patient.    Differential diagnosis to include but not limited to primary headache, dehydration, electrolyte abnormality, pancreatitis, gastritis.  Will plan for labs.  Will treat symptomatically and reassess.  See ED course for results and details.    Plan to discharge pt with f/u to PCP. Discussed returning the ED with new or worsening of symptoms. Discussed use of over the counter medications as stated on the bottle as needed for pain. Discussed taking new medication as prescribed as needed for nausea. Pt expressed understanding of discharge instructions, return precautions, and medication instructions and is stable for discharge at this time. All questions were answered and pt was discharged without incident.       Amount and/or Complexity of Data Reviewed  Labs: ordered. Decision-making details documented in ED Course.    Risk  Prescription drug management.        ED Course as of 11/20/24 1501   Wed Nov 20, 2024   1203 CBC and differential  Reviewed and without actionable derangement.    1203 WBC: 8.15  WNL   1219 Comprehensive metabolic panel(!)  Reviewed and without actionable derangement.    1219 LIPASE: 55  WNL   1256 Pt reporting continued nausea as well as headache 8/10.    1422 Pt reassessed and states he is feeling better. Pt made aware of all results and plan for DC once  medications completed. Pt agreeable.        Medications   sodium chloride 0.9 % bolus 1,000 mL (0 mL Intravenous Stopped 11/20/24 1433)   ketorolac (TORADOL) injection 15 mg (15 mg Intravenous Given 11/20/24 1155)   magnesium sulfate 2 g/50 mL IVPB (premix) 2 g (0 g Intravenous Stopped 11/20/24 1302)   diphenhydrAMINE (BENADRYL) injection 25 mg (25 mg Intravenous Given 11/20/24 1156)   ondansetron (ZOFRAN) injection 4 mg (4 mg Intravenous Given 11/20/24 1209)   droperidol (INAPSINE) injection 0.625 mg (0.625 mg Intravenous Given 11/20/24 1302)   valproate (DEPACON) 1,000 mg in sodium chloride 0.9 % 50 mL IVPB (0 mg Intravenous Stopped 11/20/24 1433)       ED Risk Strat Scores                                               History of Present Illness       Chief Complaint   Patient presents with    Vomiting    Headache     Pt reports of vomiting with nausea with a headache. Pt reports of dizziness. Pt has hx of migraines       Past Medical History:   Diagnosis Date    Migraine     Psychiatric disorder     PTSD      Past Surgical History:   Procedure Laterality Date    ANTERIOR CRUCIATE LIGAMENT REPAIR Left     APPENDECTOMY      SHOULDER SURGERY Left       History reviewed. No pertinent family history.   Social History     Tobacco Use    Smoking status: Former     Types: Cigarettes   Vaping Use    Vaping status: Former   Substance Use Topics    Alcohol use: Yes    Drug use: Never      E-Cigarette/Vaping    E-Cigarette Use Former User       E-Cigarette/Vaping Substances    Nicotine No     THC No     CBD No     Flavoring No     Other No     Unknown No       I have reviewed and agree with the history as documented.     Pt is a 40yo M who presents for headache, nausea, and vomiting.  Patient reports that he has history of headaches that are largely chronic.  Patient states that he has recently started with nausea and vomiting that has exacerbated his headache.  Patient reports that the headache feels similar to his previous  headaches however slightly more severe.  Patient reports that he has vomited multiple times.  He reports his vomit has been without blood.  Patient reports his daughters were recently sick with a similar illness and now him and his wife both have nausea and vomiting.  Patient denies any fevers.  Patient denies any visual changes.  Patient reports he has continued to take his migraine medication without improvement.            Objective       ED Triage Vitals   Temperature Pulse Blood Pressure Respirations SpO2 Patient Position - Orthostatic VS   11/20/24 1124 11/20/24 1124 11/20/24 1124 11/20/24 1124 11/20/24 1124 11/20/24 1124   98.9 °F (37.2 °C) 80 144/100 20 97 % Sitting      Temp src Heart Rate Source BP Location FiO2 (%) Pain Score    -- 11/20/24 1124 11/20/24 1124 -- 11/20/24 1155     Monitor Left arm  10 - Worst Possible Pain      Vitals      Date and Time Temp Pulse SpO2 Resp BP Pain Score FACES Pain Rating User   11/20/24 1440 -- 80 98 % 20 138/99 -- -- CHEMA   11/20/24 1305 -- 80 97 % 20 127/86 -- -- CHEMA   11/20/24 1304 -- -- -- -- -- 8 -- CHEMA   11/20/24 1155 -- -- -- -- -- 10 - Worst Possible Pain -- CHEMA   11/20/24 1124 98.9 °F (37.2 °C) 80 97 % 20 144/100 -- -- CHEMA            Physical Exam  Vitals reviewed.   Constitutional:       General: He is not in acute distress.     Appearance: He is well-developed. He is not toxic-appearing or diaphoretic.   HENT:      Head: Normocephalic and atraumatic.      Right Ear: External ear normal.      Left Ear: External ear normal.      Nose: Nose normal.      Mouth/Throat:      Pharynx: Oropharynx is clear.   Eyes:      Extraocular Movements: Extraocular movements intact.      Pupils: Pupils are equal, round, and reactive to light.   Cardiovascular:      Rate and Rhythm: Normal rate and regular rhythm.      Heart sounds: Normal heart sounds. No murmur heard.  Pulmonary:      Effort: Pulmonary effort is normal. No respiratory distress.      Breath sounds: Normal breath sounds.    Abdominal:      General: Bowel sounds are normal. There is no distension.      Palpations: Abdomen is soft.      Tenderness: There is no abdominal tenderness.   Musculoskeletal:         General: Normal range of motion.      Cervical back: Neck supple.   Skin:     General: Skin is warm and dry.      Capillary Refill: Capillary refill takes less than 2 seconds.      Coloration: Skin is not pale.   Neurological:      General: No focal deficit present.      Mental Status: He is alert and oriented to person, place, and time.      Sensory: No sensory deficit.   Psychiatric:         Speech: Speech normal.         Behavior: Behavior is cooperative.         Results Reviewed       Procedure Component Value Units Date/Time    Comprehensive metabolic panel [[id number]]  (Abnormal) Collected: 11/20/24 1155    Lab Status: Final result Specimen: Blood from Arm, Left Updated: 11/20/24 1219     Sodium 139 mmol/L      Potassium 3.8 mmol/L      Chloride 107 mmol/L      CO2 28 mmol/L      ANION GAP 4 mmol/L      BUN 15 mg/dL      Creatinine 1.25 mg/dL      Glucose 84 mg/dL      Calcium 10.0 mg/dL      AST 40 U/L      ALT 86 U/L      Alkaline Phosphatase 94 U/L      Total Protein 7.3 g/dL      Albumin 4.4 g/dL      Total Bilirubin 0.62 mg/dL      eGFR 73 ml/min/1.73sq m     Narrative:      [employer] guidelines for Chronic Kidney Disease (CKD):     Stage 1 with normal or high GFR (GFR > 90 mL/min/1.73 square meters)    Stage 2 Mild CKD (GFR = 60-89 mL/min/1.73 square meters)    Stage 3A Moderate CKD (GFR = 45-59 mL/min/1.73 square meters)    Stage 3B Moderate CKD (GFR = 30-44 mL/min/1.73 square meters)    Stage 4 Severe CKD (GFR = 15-29 mL/min/1.73 square meters)    Stage 5 End Stage CKD (GFR <15 mL/min/1.73 square meters)  Note: GFR calculation is accurate only with a steady state creatinine    Lipase [[id number]]  (Normal) Collected: 11/20/24 1155    Lab Status: Final result Specimen: Blood from Arm, Left  Updated: 24 1219     Lipase 55 u/L     CBC and differential [[id number]] Collected: 24 1155    Lab Status: Final result Specimen: Blood from Arm, Left Updated: 24 1202     WBC 8.15 Thousand/uL      RBC 5.07 Million/uL      Hemoglobin 15.5 g/dL      Hematocrit 46.9 %      MCV 93 fL      MCH 30.6 pg      MCHC 33.0 g/dL      RDW 13.1 %      MPV 9.0 fL      Platelets 252 Thousands/uL      nRBC 0 /100 WBCs      Segmented % 55 %      Immature Grans % 1 %      Lymphocytes % 29 %      Monocytes % 12 %      Eosinophils Relative 2 %      Basophils Relative 1 %      Absolute Neutrophils 4.54 Thousands/µL      Absolute Immature Grans 0.06 Thousand/uL      Absolute Lymphocytes 2.40 Thousands/µL      Absolute Monocytes 0.97 Thousand/µL      Eosinophils Absolute 0.14 Thousand/µL      Basophils Absolute 0.04 Thousands/µL             No orders to display       Procedures    ED Medication and Procedure Management   Prior to Admission Medications   Prescriptions Last Dose Informant Patient Reported? Taking?   Cholecalciferol 50 MCG ( UT) TABS   Yes No   Si mcg   Magnesium Oxide 240 MG PACK   Yes No   Sig: Take 420 mg by mouth   Nutritional Supplements (VITAMIN D BOOSTER PO)   Yes No   Sig: Take by mouth in the morning   buPROPion (WELLBUTRIN XL) 150 mg 24 hr tablet   Yes No   Si mg   dicyclomine (BENTYL) 20 mg tablet   No No   Sig: Take 1 tablet (20 mg total) by mouth 4 (four) times a day as needed (abdominal discomfort)   escitalopram (LEXAPRO) 20 mg tablet   Yes No   Sig: Take 20 mg by mouth daily   famotidine (PEPCID) 20 mg tablet   Yes No   Sig: Take 20 mg by mouth   fluticasone (FLONASE) 50 mcg/act nasal spray   No No   Si spray into each nostril daily   hydrOXYzine HCL (ATARAX) 25 mg tablet   Yes No   ibuprofen (MOTRIN) 600 mg tablet   Yes No   melatonin 3 mg   Yes No   Si mg   methocarbamol (ROBAXIN) 500 mg tablet   Yes No   Sig: Take 500 mg by mouth   pantoprazole (PROTONIX) 40 mg  tablet   No No   Sig: Take 1 tablet (40 mg total) by mouth daily   prazosin (MINIPRESS) 2 mg capsule   Yes No   Si mg   sildenafil (VIAGRA) 50 MG tablet   Yes No   Sig: TAKE ONE TABLET BY MOUTH AS DIRECTED FOR ERECTILE DYSFUNCTION 1 HR PRIOR TO SEXUAL ACTIVITY. DO NOT TAKE MORE THAN ONE DOSE IN 24 HOURS.   topiramate (TOPAMAX) 25 mg tablet   Yes No   Sig: Take 50 mg by mouth   topiramate (TOPAMAX) 50 MG tablet   Yes No   traZODone (DESYREL) 50 mg tablet   Yes No   Sig: Take 1 tablet by mouth daily at bedtime as needed      Facility-Administered Medications: None     Discharge Medication List as of 2024  2:36 PM        START taking these medications    Details   ondansetron (ZOFRAN-ODT) 4 mg disintegrating tablet Take 1 tablet (4 mg total) by mouth every 6 (six) hours as needed for nausea or vomiting, Starting Wed 2024, Normal           CONTINUE these medications which have NOT CHANGED    Details   buPROPion (WELLBUTRIN XL) 150 mg 24 hr tablet 450 mg, Starting 2024, Historical Med      Cholecalciferol 50 MCG (2000) TABS 50 mcg, Starting 2023, Historical Med      dicyclomine (BENTYL) 20 mg tablet Take 1 tablet (20 mg total) by mouth 4 (four) times a day as needed (abdominal discomfort), Starting Tu2024, Normal      escitalopram (LEXAPRO) 20 mg tablet Take 20 mg by mouth daily, Historical Med      famotidine (PEPCID) 20 mg tablet Take 20 mg by mouth, Historical Med      fluticasone (FLONASE) 50 mcg/act nasal spray 1 spray into each nostril daily, Starting Thu 2023, Normal      hydrOXYzine HCL (ATARAX) 25 mg tablet Historical Med      ibuprofen (MOTRIN) 600 mg tablet Historical Med      Magnesium Oxide 240 MG PACK Take 420 mg by mouth, Historical Med      melatonin 3 mg 12 mg, Starting 2024, Historical Med      methocarbamol (ROBAXIN) 500 mg tablet Take 500 mg by mouth, Historical Med      Nutritional Supplements (VITAMIN D BOOSTER PO) Take by mouth in the  morning, Historical Med      pantoprazole (PROTONIX) 40 mg tablet Take 1 tablet (40 mg total) by mouth daily, Starting Tue 4/30/2024, Normal      prazosin (MINIPRESS) 2 mg capsule 2 mg, Starting Fri 4/12/2024, Historical Med      sildenafil (VIAGRA) 50 MG tablet TAKE ONE TABLET BY MOUTH AS DIRECTED FOR ERECTILE DYSFUNCTION 1 HR PRIOR TO SEXUAL ACTIVITY. DO NOT TAKE MORE THAN ONE DOSE IN 24 HOURS., Historical Med      !! topiramate (TOPAMAX) 25 mg tablet Take 50 mg by mouth, Starting Fri 2/16/2024, Historical Med      !! topiramate (TOPAMAX) 50 MG tablet Historical Med      traZODone (DESYREL) 50 mg tablet Take 1 tablet by mouth daily at bedtime as needed, Starting Tue 1/10/2023, Until Thu 11/30/2023 at 2359, Historical Med       !! - Potential duplicate medications found. Please discuss with provider.        No discharge procedures on file.  ED SEPSIS DOCUMENTATION   Time reflects when diagnosis was documented in both MDM as applicable and the Disposition within this note       Time User Action Codes Description Comment    11/20/2024  2:23 PM Brandy Price [R51.9] Headache     11/20/2024  2:23 PM Brandy Price [R11.2] Nausea and vomiting                  Brandy Price MD  11/20/24 1149

## 2024-12-11 ENCOUNTER — APPOINTMENT (EMERGENCY)
Dept: RADIOLOGY | Facility: HOSPITAL | Age: 39
End: 2024-12-11
Payer: COMMERCIAL

## 2024-12-11 ENCOUNTER — HOSPITAL ENCOUNTER (EMERGENCY)
Facility: HOSPITAL | Age: 39
Discharge: HOME | End: 2024-12-11
Attending: EMERGENCY MEDICINE
Payer: COMMERCIAL

## 2024-12-11 VITALS
TEMPERATURE: 97.7 F | HEART RATE: 84 BPM | OXYGEN SATURATION: 97 % | DIASTOLIC BLOOD PRESSURE: 78 MMHG | SYSTOLIC BLOOD PRESSURE: 139 MMHG | WEIGHT: 315 LBS | RESPIRATION RATE: 19 BRPM | BODY MASS INDEX: 44.49 KG/M2

## 2024-12-11 DIAGNOSIS — R05.9 COUGH: Primary | ICD-10-CM

## 2024-12-11 DIAGNOSIS — R20.2 PARESTHESIA OF BOTH FEET: ICD-10-CM

## 2024-12-11 LAB
ALBUMIN SERPL BCG-MCNC: 4 G/DL (ref 3.5–5)
ALP SERPL-CCNC: 86 U/L (ref 34–104)
ALT SERPL W P-5'-P-CCNC: 66 U/L (ref 7–52)
ANION GAP SERPL CALCULATED.3IONS-SCNC: 6 MMOL/L (ref 4–13)
AST SERPL W P-5'-P-CCNC: 32 U/L (ref 13–39)
BASOPHILS # BLD AUTO: 0.04 THOUSANDS/ÂΜL (ref 0–0.1)
BASOPHILS NFR BLD AUTO: 0 % (ref 0–1)
BILIRUB SERPL-MCNC: 0.72 MG/DL (ref 0.2–1)
BUN SERPL-MCNC: 15 MG/DL (ref 5–25)
CALCIUM SERPL-MCNC: 9.9 MG/DL (ref 8.4–10.2)
CHLORIDE SERPL-SCNC: 107 MMOL/L (ref 96–108)
CO2 SERPL-SCNC: 25 MMOL/L (ref 21–32)
CREAT SERPL-MCNC: 1.13 MG/DL (ref 0.6–1.3)
EOSINOPHIL # BLD AUTO: 0.27 THOUSAND/ÂΜL (ref 0–0.61)
EOSINOPHIL NFR BLD AUTO: 3 % (ref 0–6)
ERYTHROCYTE [DISTWIDTH] IN BLOOD BY AUTOMATED COUNT: 13.2 % (ref 11.6–15.1)
FLUAV AG UPPER RESP QL IA.RAPID: NEGATIVE
FLUBV AG UPPER RESP QL IA.RAPID: NEGATIVE
GFR SERPL CREATININE-BSD FRML MDRD: 82 ML/MIN/1.73SQ M
GLUCOSE SERPL-MCNC: 110 MG/DL (ref 65–140)
HCT VFR BLD AUTO: 42 % (ref 36.5–49.3)
HGB BLD-MCNC: 14.4 G/DL (ref 12–17)
IMM GRANULOCYTES # BLD AUTO: 0.13 THOUSAND/UL (ref 0–0.2)
IMM GRANULOCYTES NFR BLD AUTO: 1 % (ref 0–2)
LYMPHOCYTES # BLD AUTO: 2.52 THOUSANDS/ÂΜL (ref 0.6–4.47)
LYMPHOCYTES NFR BLD AUTO: 24 % (ref 14–44)
MAGNESIUM SERPL-MCNC: 2 MG/DL (ref 1.9–2.7)
MCH RBC QN AUTO: 30.6 PG (ref 26.8–34.3)
MCHC RBC AUTO-ENTMCNC: 34.3 G/DL (ref 31.4–37.4)
MCV RBC AUTO: 89 FL (ref 82–98)
MONOCYTES # BLD AUTO: 0.77 THOUSAND/ÂΜL (ref 0.17–1.22)
MONOCYTES NFR BLD AUTO: 7 % (ref 4–12)
NEUTROPHILS # BLD AUTO: 6.84 THOUSANDS/ÂΜL (ref 1.85–7.62)
NEUTS SEG NFR BLD AUTO: 65 % (ref 43–75)
NRBC BLD AUTO-RTO: 0 /100 WBCS
PLATELET # BLD AUTO: 269 THOUSANDS/UL (ref 149–390)
PMV BLD AUTO: 9.2 FL (ref 8.9–12.7)
POTASSIUM SERPL-SCNC: 3.8 MMOL/L (ref 3.5–5.3)
PROT SERPL-MCNC: 6.8 G/DL (ref 6.4–8.4)
RBC # BLD AUTO: 4.7 MILLION/UL (ref 3.88–5.62)
SARS-COV+SARS-COV-2 AG RESP QL IA.RAPID: NEGATIVE
SODIUM SERPL-SCNC: 138 MMOL/L (ref 135–147)
WBC # BLD AUTO: 10.57 THOUSAND/UL (ref 4.31–10.16)

## 2024-12-11 PROCEDURE — 71046 X-RAY EXAM CHEST 2 VIEWS: CPT

## 2024-12-11 PROCEDURE — 99284 EMERGENCY DEPT VISIT MOD MDM: CPT | Performed by: PHYSICIAN ASSISTANT

## 2024-12-11 PROCEDURE — 85025 COMPLETE CBC W/AUTO DIFF WBC: CPT | Performed by: PHYSICIAN ASSISTANT

## 2024-12-11 PROCEDURE — 99283 EMERGENCY DEPT VISIT LOW MDM: CPT

## 2024-12-11 PROCEDURE — 87804 INFLUENZA ASSAY W/OPTIC: CPT | Performed by: PHYSICIAN ASSISTANT

## 2024-12-11 PROCEDURE — 80053 COMPREHEN METABOLIC PANEL: CPT | Performed by: PHYSICIAN ASSISTANT

## 2024-12-11 PROCEDURE — 87811 SARS-COV-2 COVID19 W/OPTIC: CPT | Performed by: PHYSICIAN ASSISTANT

## 2024-12-11 PROCEDURE — 83735 ASSAY OF MAGNESIUM: CPT | Performed by: PHYSICIAN ASSISTANT

## 2024-12-11 PROCEDURE — 36415 COLL VENOUS BLD VENIPUNCTURE: CPT | Performed by: PHYSICIAN ASSISTANT

## 2024-12-11 RX ORDER — BENZONATATE 100 MG/1
100 CAPSULE ORAL EVERY 8 HOURS
Qty: 21 CAPSULE | Refills: 0 | Status: SHIPPED | OUTPATIENT
Start: 2024-12-11

## 2024-12-11 NOTE — ED PROVIDER NOTES
Time reflects when diagnosis was documented in both MDM as applicable and the Disposition within this note       Time User Action Codes Description Comment    12/11/2024  9:08 AM Jamie Brantley Add [R05.9] Cough     12/11/2024  9:08 AM Jamie Brantley Add [R20.2] Paresthesia of both feet           ED Disposition       ED Disposition   Discharge    Condition   Stable    Date/Time   Wed Dec 11, 2024  9:08 AM    Comment   Elver Hanks discharge to home/self care.                   Assessment & Plan       Medical Decision Making  Differential diagnosis for patient's cough includes pneumonia, bronchitis, viral URI with cough, COVID, influenza.  Pulse ox is 97% on room air indicating adequate oxygenation.  He is in no respiratory distress.  I ordered a chest x-ray.  I independently interpreted as no acute cardiopulmonary abnormality.  Patient is negative for COVID and influenza.  Plan will be discharge with prescription for Tessalon Perles for cough.  Given recent sick contact at home (daughter) with cough, I suspect viral etiology to patient's cough.  I did not feel he needed antibiotics at this time.  Advised follow-up with [facility] if no improvement next 3 to 5 days.    Differential diagnosis for tingling in both feet includes peripheral neuropathy  Labs were reviewed.  Blood sugar is 110 which is fasting  There are no signs or symptoms of infection in the feet.  He was advised to follow-up with his primary care provider next 3 to 5 days if symptoms persist    Return precautions were reviewed.    Amount and/or Complexity of Data Reviewed  Labs: ordered.  Radiology: ordered.    Risk  Prescription drug management.             Medications - No data to display    ED Risk Strat Scores                                               History of Present Illness       Chief Complaint   Patient presents with    Cough      cough for 3 weeks. Denies fevers or chills. Tingling to both feet for 3 days.       Past Medical  "History:   Diagnosis Date    Migraine     Psychiatric disorder     PTSD      Past Surgical History:   Procedure Laterality Date    ANTERIOR CRUCIATE LIGAMENT REPAIR Left     APPENDECTOMY      SHOULDER SURGERY Left       History reviewed. No pertinent family history.   Social History     Tobacco Use    Smoking status: Former     Types: Cigarettes   Vaping Use    Vaping status: Former   Substance Use Topics    Alcohol use: Yes    Drug use: Never      E-Cigarette/Vaping    E-Cigarette Use Former User       E-Cigarette/Vaping Substances    Nicotine No     THC No     CBD No     Flavoring No     Other No     Unknown No       I have reviewed and agree with the history as documented.     Patient is a 39-year-old  male history of obstructive sleep apnea, anxiety, PTSD presents with 2 complaints.  First, patient reports 2 to 3-week history of a dry cough.  Cough is worse at night. No fever or chills.  No wheezing or shortness of breath.  He is not a smoker.  States his daughter has had a cough for several weeks. Secondly, patient reports 3 day history of \"pinching\" sensation to both feet. Reports feet hurt when walking barefooted. Feet do not hurt when walking in shoes. No h/o DM or neuropathy.         Review of Systems   Constitutional:  Negative for fever.   HENT:  Negative for sore throat.    Respiratory:  Positive for cough. Negative for shortness of breath and wheezing.    Cardiovascular:  Negative for chest pain.   Gastrointestinal:  Negative for abdominal pain, diarrhea, nausea and vomiting.   Neurological:  Negative for numbness.           Objective       ED Triage Vitals   Temperature Pulse Blood Pressure Respirations SpO2 Patient Position - Orthostatic VS   12/11/24 0725 12/11/24 0725 12/11/24 0725 12/11/24 0725 12/11/24 0725 12/11/24 0725   (!) 96.9 °F (36.1 °C) 89 144/84 20 96 % Sitting      Temp Source Heart Rate Source BP Location FiO2 (%) Pain Score    12/11/24 0725 12/11/24 0725 12/11/24 0725 -- " "12/11/24 0807    Tympanic Monitor Right arm  2      Vitals      Date and Time Temp Pulse SpO2 Resp BP Pain Score FACES Pain Rating User   12/11/24 0905 -- -- -- -- -- No Pain -- JUAN JOSE   12/11/24 0807 97.7 °F (36.5 °C) 84 97 % 19 139/78 2 reports mild bilateral foot discomfort, states the \"tingling\" is bothersome -- JUAN JOSE   12/11/24 0725 96.9 °F (36.1 °C) 89 96 % 20 144/84 -- -- OE            Physical Exam  Vitals and nursing note reviewed.   Constitutional:       Appearance: Normal appearance.   HENT:      Head: Normocephalic and atraumatic.      Right Ear: Tympanic membrane, ear canal and external ear normal.      Left Ear: Tympanic membrane, ear canal and external ear normal.      Nose: Nose normal.      Mouth/Throat:      Mouth: Mucous membranes are moist.      Pharynx: Oropharynx is clear.   Eyes:      Extraocular Movements: Extraocular movements intact.      Conjunctiva/sclera: Conjunctivae normal.      Pupils: Pupils are equal, round, and reactive to light.   Cardiovascular:      Rate and Rhythm: Normal rate and regular rhythm.      Pulses: Normal pulses.      Heart sounds: Normal heart sounds.   Pulmonary:      Effort: Pulmonary effort is normal.      Breath sounds: Normal breath sounds.   Abdominal:      General: Abdomen is flat. Bowel sounds are normal.      Palpations: Abdomen is soft.   Musculoskeletal:         General: Normal range of motion.      Cervical back: Normal range of motion and neck supple.      Right lower leg: No edema.      Left lower leg: No edema.   Skin:     General: Skin is warm and dry.      Capillary Refill: Capillary refill takes less than 2 seconds.   Neurological:      General: No focal deficit present.      Mental Status: He is alert and oriented to person, place, and time. Mental status is at baseline.         Results Reviewed       Procedure Component Value Units Date/Time    FLU/COVID Rapid Antigen (30 min. TAT) - Preferred screening test in ED [[id number]]  (Normal) Collected: " 12/11/24 0817    Lab Status: Final result Specimen: Nares from Nose Updated: 12/11/24 0838     SARS COV Rapid Antigen Negative     Influenza A Rapid Antigen Negative     Influenza B Rapid Antigen Negative    Narrative:      This test has been performed using the [employer] Sarahi 2 FLU+SARS Antigen test under the Emergency Use Authorization (EUA). This test has been validated by the  and verified by the performing laboratory. The Sarahi uses lateral flow immunofluorescent sandwich assay to detect SARS-COV, Influenza A and Influenza B Antigen.     The [employer] Sarahi 2 SARS Antigen test does not differentiate between SARS-CoV and SARS-CoV-2.     Negative results are presumptive and may be confirmed with a molecular assay, if necessary, for patient management. Negative results do not rule out SARS-CoV-2 or influenza infection and should not be used as the sole basis for treatment or patient management decisions. A negative test result may occur if the level of antigen in a sample is below the limit of detection of this test.     Positive results are indicative of the presence of viral antigens, but do not rule out bacterial infection or co-infection with other viruses.     All test results should be used as an adjunct to clinical observations and other information available to the provider.    FOR PEDIATRIC PATIENTS - copy/paste COVID Guidelines URL to browser: [email or web address]    Comprehensive metabolic panel [[id number]]  (Abnormal) Collected: 12/11/24 0817    Lab Status: Final result Specimen: Blood from Arm, Right Updated: 12/11/24 0837     Sodium 138 mmol/L      Potassium 3.8 mmol/L      Chloride 107 mmol/L      CO2 25 mmol/L      ANION GAP 6 mmol/L      BUN 15 mg/dL      Creatinine 1.13 mg/dL      Glucose 110 mg/dL      Calcium 9.9 mg/dL      AST 32 U/L      ALT 66 U/L      Alkaline Phosphatase 86 U/L      Total Protein 6.8 g/dL      Albumin 4.0 g/dL       Total Bilirubin 0.72 mg/dL      eGFR 82 ml/min/1.73sq m     Narrative:      National Kidney Disease Foundation guidelines for Chronic Kidney Disease (CKD):     Stage 1 with normal or high GFR (GFR > 90 mL/min/1.73 square meters)    Stage 2 Mild CKD (GFR = 60-89 mL/min/1.73 square meters)    Stage 3A Moderate CKD (GFR = 45-59 mL/min/1.73 square meters)    Stage 3B Moderate CKD (GFR = 30-44 mL/min/1.73 square meters)    Stage 4 Severe CKD (GFR = 15-29 mL/min/1.73 square meters)    Stage 5 End Stage CKD (GFR <15 mL/min/1.73 square meters)  Note: GFR calculation is accurate only with a steady state creatinine    Magnesium [[id number]]  (Normal) Collected: 12/11/24 0817    Lab Status: Final result Specimen: Blood from Arm, Right Updated: 12/11/24 0837     Magnesium 2.0 mg/dL     CBC and differential [[id number]]  (Abnormal) Collected: 12/11/24 0817    Lab Status: Final result Specimen: Blood from Arm, Right Updated: 12/11/24 0821     WBC 10.57 Thousand/uL      RBC 4.70 Million/uL      Hemoglobin 14.4 g/dL      Hematocrit 42.0 %      MCV 89 fL      MCH 30.6 pg      MCHC 34.3 g/dL      RDW 13.2 %      MPV 9.2 fL      Platelets 269 Thousands/uL      nRBC 0 /100 WBCs      Segmented % 65 %      Immature Grans % 1 %      Lymphocytes % 24 %      Monocytes % 7 %      Eosinophils Relative 3 %      Basophils Relative 0 %      Absolute Neutrophils 6.84 Thousands/µL      Absolute Immature Grans 0.13 Thousand/uL      Absolute Lymphocytes 2.52 Thousands/µL      Absolute Monocytes 0.77 Thousand/µL      Eosinophils Absolute 0.27 Thousand/µL      Basophils Absolute 0.04 Thousands/µL             XR chest 2 views   Final Interpretation by Aldair Sheridan MD (12/11 0828)      No radiographic evidence of acute intrathoracic process.            Workstation performed: [id number]             Procedures    ED Medication and Procedure Management   Prior to Admission Medications   Prescriptions Last Dose Informant Patient Reported?  Taking?   Cholecalciferol 50 MCG (2000) TABS   Yes No   Si mcg   Magnesium Oxide 240 MG PACK   Yes No   Sig: Take 420 mg by mouth   Nutritional Supplements (VITAMIN D BOOSTER PO)   Yes No   Sig: Take by mouth in the morning   buPROPion (WELLBUTRIN XL) 150 mg 24 hr tablet   Yes No   Si mg   dicyclomine (BENTYL) 20 mg tablet   No No   Sig: Take 1 tablet (20 mg total) by mouth 4 (four) times a day as needed (abdominal discomfort)   escitalopram (LEXAPRO) 20 mg tablet   Yes No   Sig: Take 20 mg by mouth daily   famotidine (PEPCID) 20 mg tablet   Yes No   Sig: Take 20 mg by mouth   fluticasone (FLONASE) 50 mcg/act nasal spray   No No   Si spray into each nostril daily   hydrOXYzine HCL (ATARAX) 25 mg tablet   Yes No   ibuprofen (MOTRIN) 600 mg tablet   Yes No   melatonin 3 mg   Yes No   Si mg   methocarbamol (ROBAXIN) 500 mg tablet   Yes No   Sig: Take 500 mg by mouth   ondansetron (ZOFRAN-ODT) 4 mg disintegrating tablet   No No   Sig: Take 1 tablet (4 mg total) by mouth every 6 (six) hours as needed for nausea or vomiting   pantoprazole (PROTONIX) 40 mg tablet   No No   Sig: Take 1 tablet (40 mg total) by mouth daily   prazosin (MINIPRESS) 2 mg capsule   Yes No   Si mg   sildenafil (VIAGRA) 50 MG tablet   Yes No   Sig: TAKE ONE TABLET BY MOUTH AS DIRECTED FOR ERECTILE DYSFUNCTION 1 HR PRIOR TO SEXUAL ACTIVITY. DO NOT TAKE MORE THAN ONE DOSE IN 24 HOURS.   topiramate (TOPAMAX) 25 mg tablet   Yes No   Sig: Take 50 mg by mouth   topiramate (TOPAMAX) 50 MG tablet   Yes No   traZODone (DESYREL) 50 mg tablet   Yes No   Sig: Take 1 tablet by mouth daily at bedtime as needed      Facility-Administered Medications: None     Patient's Medications   Discharge Prescriptions    BENZONATATE (TESSALON PERLES) 100 MG CAPSULE    Take 1 capsule (100 mg total) by mouth every 8 (eight) hours       Start Date: 2024End Date: --       Order Dose: 100 mg       Quantity: 21 capsule    Refills: 0     No discharge  procedures on file.  ED SEPSIS DOCUMENTATION   Time reflects when diagnosis was documented in both MDM as applicable and the Disposition within this note       Time User Action Codes Description Comment    12/11/2024  9:08 AM Jamie Brantley Add [R05.9] Cough     12/11/2024  9:08 AM Jamie Brantley [R20.2] Paresthesia of both feet                  Jamie Brantley PA-C  12/11/24 0915

## 2024-12-11 NOTE — DISCHARGE INSTRUCTIONS
Follow up with your primary care provider at the [facility] for recheck if no improvement next 3-5 days    Tessalon perles for cough    Return to ED for fever, shortness of breath, worsening symptoms

## 2024-12-11 NOTE — Clinical Note
Elver Hanks was seen and treated in our emergency department on 12/11/2024.                Diagnosis:     Elver  .    He may return on this date:     Elver Hanks is excused from work through Wednesday December 11th     If you have any questions or concerns, please don't hesitate to call.      Jamie Brantley PA-C    ______________________________           _______________          _______________  Hospital Representative                              Date                                Time

## 2024-12-11 NOTE — Clinical Note
Elver Hanks was seen and treated in our emergency department on 12/11/2024.    No restrictions            Diagnosis:     Elver  .    He may return on this date: 12/12/2024    Elver Hanks is excused from work through Wednesday December 11th     If you have any questions or concerns, please don't hesitate to call.      Raissa Alaniz, RORY    ______________________________           _______________          _______________  Hospital Representative                              Date                                Time

## 2025-01-22 ENCOUNTER — HOSPITAL ENCOUNTER (EMERGENCY)
Facility: HOSPITAL | Age: 40
Discharge: HOME | End: 2025-01-22
Payer: COMMERCIAL

## 2025-01-22 VITALS
DIASTOLIC BLOOD PRESSURE: 82 MMHG | TEMPERATURE: 98 F | RESPIRATION RATE: 18 BRPM | HEART RATE: 116 BPM | SYSTOLIC BLOOD PRESSURE: 141 MMHG | OXYGEN SATURATION: 97 %

## 2025-01-22 DIAGNOSIS — R09.81 CONGESTION OF NASAL SINUS: ICD-10-CM

## 2025-01-22 DIAGNOSIS — J06.9 URI (UPPER RESPIRATORY INFECTION): Primary | ICD-10-CM

## 2025-01-22 DIAGNOSIS — R05.9 COUGH: ICD-10-CM

## 2025-01-22 LAB
FLUAV AG UPPER RESP QL IA.RAPID: NEGATIVE
FLUBV AG UPPER RESP QL IA.RAPID: NEGATIVE
S PYO DNA THROAT QL NAA+PROBE: NOT DETECTED
SARS-COV+SARS-COV-2 AG RESP QL IA.RAPID: NEGATIVE

## 2025-01-22 PROCEDURE — 87804 INFLUENZA ASSAY W/OPTIC: CPT

## 2025-01-22 PROCEDURE — 87811 SARS-COV-2 COVID19 W/OPTIC: CPT

## 2025-01-22 PROCEDURE — 99283 EMERGENCY DEPT VISIT LOW MDM: CPT

## 2025-01-22 PROCEDURE — 87651 STREP A DNA AMP PROBE: CPT

## 2025-01-22 PROCEDURE — 99284 EMERGENCY DEPT VISIT MOD MDM: CPT

## 2025-01-22 RX ORDER — PSEUDOEPHEDRINE HCL 120 MG/1
120 TABLET, FILM COATED, EXTENDED RELEASE ORAL 2 TIMES DAILY
Qty: 60 TABLET | Refills: 0 | Status: SHIPPED | OUTPATIENT
Start: 2025-01-22

## 2025-01-22 RX ORDER — PSEUDOEPHEDRINE HCL 30 MG/1
60 TABLET, FILM COATED ORAL ONCE
Status: COMPLETED | OUTPATIENT
Start: 2025-01-22 | End: 2025-01-22

## 2025-01-22 RX ORDER — IBUPROFEN 600 MG/1
600 TABLET, FILM COATED ORAL EVERY 6 HOURS PRN
Qty: 30 TABLET | Refills: 0 | Status: SHIPPED | OUTPATIENT
Start: 2025-01-22

## 2025-01-22 RX ORDER — GUAIFENESIN 600 MG/1
1200 TABLET, EXTENDED RELEASE ORAL EVERY 12 HOURS SCHEDULED
Qty: 120 TABLET | Refills: 0 | Status: SHIPPED | OUTPATIENT
Start: 2025-01-22

## 2025-01-22 RX ORDER — GUAIFENESIN 600 MG/1
600 TABLET, EXTENDED RELEASE ORAL ONCE
Status: COMPLETED | OUTPATIENT
Start: 2025-01-22 | End: 2025-01-22

## 2025-01-22 RX ORDER — ACETAMINOPHEN 325 MG/1
650 TABLET ORAL ONCE
Status: COMPLETED | OUTPATIENT
Start: 2025-01-22 | End: 2025-01-22

## 2025-01-22 RX ORDER — NAPROXEN 500 MG/1
500 TABLET ORAL ONCE
Status: COMPLETED | OUTPATIENT
Start: 2025-01-22 | End: 2025-01-22

## 2025-01-22 RX ORDER — SENNOSIDES 8.6 MG
650 CAPSULE ORAL EVERY 8 HOURS PRN
Qty: 30 TABLET | Refills: 0 | Status: SHIPPED | OUTPATIENT
Start: 2025-01-22

## 2025-01-22 RX ADMIN — GUAIFENESIN 600 MG: 600 TABLET, EXTENDED RELEASE ORAL at 11:49

## 2025-01-22 RX ADMIN — ACETAMINOPHEN 650 MG: 325 TABLET ORAL at 11:50

## 2025-01-22 RX ADMIN — NAPROXEN 500 MG: 500 TABLET ORAL at 11:49

## 2025-01-22 RX ADMIN — PSEUDOEPHEDRINE HCL 60 MG: 30 TABLET, FILM COATED ORAL at 11:50

## 2025-01-22 NOTE — Clinical Note
Elver Hanks was seen and treated in our emergency department on 1/22/2025.                Diagnosis:     Elver  .    He may return on this date: 01/25/2025    May return sooner if symptoms improve.      If you have any questions or concerns, please don't hesitate to call.      Tyrone Shannon MD    ______________________________           _______________          _______________  Hospital Representative                              Date                                Time

## 2025-01-22 NOTE — ED PROVIDER NOTES
ED Disposition       None          Assessment & Plan   {Hyperlinks  Risk Stratification - NIHSS - HEART SCORE - Fill out sepsis note and make sure you call [phone] if severe or septic shock:[id number]}    MDM         Medications - No data to display    ED Risk Strat Scores                                              History of Present Illness   {Hyperlinks  History (Med, Surg, Fam, Social) - Current Medications - Allergies  :[id number]}    No chief complaint on file.      Past Medical History:   Diagnosis Date    Migraine     Psychiatric disorder     PTSD      Past Surgical History:   Procedure Laterality Date    ANTERIOR CRUCIATE LIGAMENT REPAIR Left     APPENDECTOMY      SHOULDER SURGERY Left       No family history on file.   Social History     Tobacco Use    Smoking status: Former     Types: Cigarettes   Vaping Use    Vaping status: Former   Substance Use Topics    Alcohol use: Yes    Drug use: Never      E-Cigarette/Vaping    E-Cigarette Use Former User       E-Cigarette/Vaping Substances    Nicotine No     THC No     CBD No     Flavoring No     Other No     Unknown No       I have reviewed and agree with the history as documented.     HPI    Review of Systems        Objective   {Hyperlinks  Historical Vitals - Historical Labs - Chart Review/Microbiology - Last Echo - Code Status  :[id number]}    ED Triage Vitals   Temp Pulse BP Resp SpO2 Patient Position - Orthostatic VS   -- -- -- -- -- --      Temp src Heart Rate Source BP Location FiO2 (%) Pain Score    -- -- -- -- --      Vitals    None         Physical Exam    Results Reviewed       None            No orders to display       Procedures    ED Medication and Procedure Management   Prior to Admission Medications   Prescriptions Last Dose Informant Patient Reported? Taking?   Cholecalciferol 50 MCG (2000) TABS   Yes No   Si mcg   Magnesium Oxide 240 MG PACK   Yes No   Sig: Take 420 mg by mouth   Nutritional Supplements (VITAMIN D BOOSTER PO)   Yes  No   Sig: Take by mouth in the morning   benzonatate (TESSALON PERLES) 100 mg capsule   No No   Sig: Take 1 capsule (100 mg total) by mouth every 8 (eight) hours   buPROPion (WELLBUTRIN XL) 150 mg 24 hr tablet   Yes No   Si mg   dicyclomine (BENTYL) 20 mg tablet   No No   Sig: Take 1 tablet (20 mg total) by mouth 4 (four) times a day as needed (abdominal discomfort)   escitalopram (LEXAPRO) 20 mg tablet   Yes No   Sig: Take 20 mg by mouth daily   famotidine (PEPCID) 20 mg tablet   Yes No   Sig: Take 20 mg by mouth   fluticasone (FLONASE) 50 mcg/act nasal spray   No No   Si spray into each nostril daily   hydrOXYzine HCL (ATARAX) 25 mg tablet   Yes No   ibuprofen (MOTRIN) 600 mg tablet   Yes No   melatonin 3 mg   Yes No   Si mg   methocarbamol (ROBAXIN) 500 mg tablet   Yes No   Sig: Take 500 mg by mouth   ondansetron (ZOFRAN-ODT) 4 mg disintegrating tablet   No No   Sig: Take 1 tablet (4 mg total) by mouth every 6 (six) hours as needed for nausea or vomiting   pantoprazole (PROTONIX) 40 mg tablet   No No   Sig: Take 1 tablet (40 mg total) by mouth daily   prazosin (MINIPRESS) 2 mg capsule   Yes No   Si mg   sildenafil (VIAGRA) 50 MG tablet   Yes No   Sig: TAKE ONE TABLET BY MOUTH AS DIRECTED FOR ERECTILE DYSFUNCTION 1 HR PRIOR TO SEXUAL ACTIVITY. DO NOT TAKE MORE THAN ONE DOSE IN 24 HOURS.   topiramate (TOPAMAX) 25 mg tablet   Yes No   Sig: Take 50 mg by mouth   topiramate (TOPAMAX) 50 MG tablet   Yes No   traZODone (DESYREL) 50 mg tablet   Yes No   Sig: Take 1 tablet by mouth daily at bedtime as needed      Facility-Administered Medications: None     Patient's Medications   Discharge Prescriptions    No medications on file     No discharge procedures on file.  ED SEPSIS DOCUMENTATION          exudate.   Eyes:      Extraocular Movements: Extraocular movements intact.   Pulmonary:      Effort: Pulmonary effort is normal.      Breath sounds: No wheezing or rales.   Abdominal:      Palpations: Abdomen is soft.   Skin:     General: Skin is warm.   Neurological:      Mental Status: He is alert.   Psychiatric:         Mood and Affect: Mood normal.         Results Reviewed       Procedure Component Value Units Date/Time    Strep A PCR [[id number]]  (Normal) Collected: 01/22/25 1122    Lab Status: Final result Specimen: Throat Updated: 01/22/25 1156     STREP A PCR Not Detected    FLU/COVID Rapid Antigen (30 min. TAT) - Preferred screening test in ED [[id number]]  (Normal) Collected: 01/22/25 1122    Lab Status: Final result Specimen: Nares from Nose Updated: 01/22/25 1149     SARS COV Rapid Antigen Negative     Influenza A Rapid Antigen Negative     Influenza B Rapid Antigen Negative    Narrative:      This test has been performed using the [employer] [facility] 2 FLU+SARS Antigen test under the Emergency Use Authorization (EUA). This test has been validated by the  and verified by the performing laboratory. The Sarahi uses lateral flow immunofluorescent sandwich assay to detect SARS-COV, Influenza A and Influenza B Antigen.     The [employer] [facility] 2 SARS Antigen test does not differentiate between SARS-CoV and SARS-CoV-2.     Negative results are presumptive and may be confirmed with a molecular assay, if necessary, for patient management. Negative results do not rule out SARS-CoV-2 or influenza infection and should not be used as the sole basis for treatment or patient management decisions. A negative test result may occur if the level of antigen in a sample is below the limit of detection of this test.     Positive results are indicative of the presence of viral antigens, but do not rule out bacterial infection or co-infection with other viruses.     All test results should be used as an adjunct to clinical  observations and other information available to the provider.    FOR PEDIATRIC PATIENTS - copy/paste COVID Guidelines URL to browser: [email or web address]            No orders to display       Procedures    ED Medication and Procedure Management   Prior to Admission Medications   Prescriptions Last Dose Informant Patient Reported? Taking?   Cholecalciferol 50 MCG (2000 UT) TABS   Yes No   Si mcg   Magnesium Oxide 240 MG PACK   Yes No   Sig: Take 420 mg by mouth   Nutritional Supplements (VITAMIN D BOOSTER PO)   Yes No   Sig: Take by mouth in the morning   benzonatate (TESSALON PERLES) 100 mg capsule   No No   Sig: Take 1 capsule (100 mg total) by mouth every 8 (eight) hours   buPROPion (WELLBUTRIN XL) 150 mg 24 hr tablet   Yes No   Si mg   dicyclomine (BENTYL) 20 mg tablet   No No   Sig: Take 1 tablet (20 mg total) by mouth 4 (four) times a day as needed (abdominal discomfort)   escitalopram (LEXAPRO) 20 mg tablet   Yes No   Sig: Take 20 mg by mouth daily   famotidine (PEPCID) 20 mg tablet   Yes No   Sig: Take 20 mg by mouth   fluticasone (FLONASE) 50 mcg/act nasal spray   No No   Si spray into each nostril daily   hydrOXYzine HCL (ATARAX) 25 mg tablet   Yes No   ibuprofen (MOTRIN) 600 mg tablet   Yes No   melatonin 3 mg   Yes No   Si mg   methocarbamol (ROBAXIN) 500 mg tablet   Yes No   Sig: Take 500 mg by mouth   ondansetron (ZOFRAN-ODT) 4 mg disintegrating tablet   No No   Sig: Take 1 tablet (4 mg total) by mouth every 6 (six) hours as needed for nausea or vomiting   pantoprazole (PROTONIX) 40 mg tablet   No No   Sig: Take 1 tablet (40 mg total) by mouth daily   prazosin (MINIPRESS) 2 mg capsule   Yes No   Si mg   sildenafil (VIAGRA) 50 MG tablet   Yes No   Sig: TAKE ONE TABLET BY MOUTH AS DIRECTED FOR ERECTILE DYSFUNCTION 1 HR PRIOR TO SEXUAL ACTIVITY. DO NOT TAKE MORE THAN ONE DOSE IN 24 HOURS.   topiramate (TOPAMAX) 25 mg tablet    Yes No   Sig: Take 50 mg by mouth   topiramate (TOPAMAX) 50 MG tablet   Yes No   traZODone (DESYREL) 50 mg tablet   Yes No   Sig: Take 1 tablet by mouth daily at bedtime as needed      Facility-Administered Medications: None     Discharge Medication List as of 1/22/2025 12:01 PM        START taking these medications    Details   acetaminophen (TYLENOL) 650 mg CR tablet Take 1 tablet (650 mg total) by mouth every 8 (eight) hours as needed for mild pain, Starting Wed 1/22/2025, Normal      guaiFENesin (MUCINEX) 600 mg 12 hr tablet Take 2 tablets (1,200 mg total) by mouth every 12 (twelve) hours, Starting Wed 1/22/2025, Normal      !! ibuprofen (MOTRIN) 600 mg tablet Take 1 tablet (600 mg total) by mouth every 6 (six) hours as needed for mild pain or fever, Starting Wed 1/22/2025, Normal      pseudoephedrine (SUDAFED) 120 MG 12 hr tablet Take 1 tablet (120 mg total) by mouth 2 (two) times a day, Starting Wed 1/22/2025, Normal       !! - Potential duplicate medications found. Please discuss with provider.        CONTINUE these medications which have NOT CHANGED    Details   benzonatate (TESSALON PERLES) 100 mg capsule Take 1 capsule (100 mg total) by mouth every 8 (eight) hours, Starting Wed 12/11/2024, Normal      buPROPion (WELLBUTRIN XL) 150 mg 24 hr tablet 450 mg, Starting Fri 4/12/2024, Historical Med      Cholecalciferol 50 MCG (2000 UT) TABS 50 mcg, Starting Fri 7/14/2023, Historical Med      dicyclomine (BENTYL) 20 mg tablet Take 1 tablet (20 mg total) by mouth 4 (four) times a day as needed (abdominal discomfort), Starting Tue 4/30/2024, Normal      escitalopram (LEXAPRO) 20 mg tablet Take 20 mg by mouth daily, Historical Med      famotidine (PEPCID) 20 mg tablet Take 20 mg by mouth, Historical Med      fluticasone (FLONASE) 50 mcg/act nasal spray 1 spray into each nostril daily, Starting Thu 11/16/2023, Normal      hydrOXYzine HCL (ATARAX) 25 mg tablet Historical Med      !! ibuprofen (MOTRIN) 600 mg tablet  Historical Med      Magnesium Oxide 240 MG PACK Take 420 mg by mouth, Historical Med      melatonin 3 mg 12 mg, Starting Fri 4/12/2024, Historical Med      methocarbamol (ROBAXIN) 500 mg tablet Take 500 mg by mouth, Historical Med      Nutritional Supplements (VITAMIN D BOOSTER PO) Take by mouth in the morning, Historical Med      ondansetron (ZOFRAN-ODT) 4 mg disintegrating tablet Take 1 tablet (4 mg total) by mouth every 6 (six) hours as needed for nausea or vomiting, Starting Wed 11/20/2024, Normal      pantoprazole (PROTONIX) 40 mg tablet Take 1 tablet (40 mg total) by mouth daily, Starting Tue 4/30/2024, Normal      prazosin (MINIPRESS) 2 mg capsule 2 mg, Starting Fri 4/12/2024, Historical Med      sildenafil (VIAGRA) 50 MG tablet TAKE ONE TABLET BY MOUTH AS DIRECTED FOR ERECTILE DYSFUNCTION 1 HR PRIOR TO SEXUAL ACTIVITY. DO NOT TAKE MORE THAN ONE DOSE IN 24 HOURS., Historical Med      !! topiramate (TOPAMAX) 25 mg tablet Take 50 mg by mouth, Starting Fri 2/16/2024, Historical Med      !! topiramate (TOPAMAX) 50 MG tablet Historical Med      traZODone (DESYREL) 50 mg tablet Take 1 tablet by mouth daily at bedtime as needed, Starting Tue 1/10/2023, Until Thu 11/30/2023 at 2359, Historical Med       !! - Potential duplicate medications found. Please discuss with provider.        No discharge procedures on file.  ED SEPSIS DOCUMENTATION   Time reflects when diagnosis was documented in both MDM as applicable and the Disposition within this note       Time User Action Codes Description Comment    1/22/2025 11:58 AM Tyrone Shannon [J06.9] URI (upper respiratory infection)     1/22/2025 11:59 AM Tyrone Shannon Add [R05.9] Cough     1/22/2025 11:59 AM Tyrone Shannon [R09.81] Congestion of nasal sinus                  Tyrone Shannon MD  01/28/25 4097

## 2025-01-22 NOTE — DISCHARGE INSTRUCTIONS
Your testing did not show flu or covid (but this test is only 70% accurate) so given your family member having the same symptoms and diagnosis of Flu, I believe you likely still have this. The treatment for your symptoms is supportive, meaning medications to help with symptoms while your body fights off the virus.

## 2025-01-27 ENCOUNTER — OFFICE VISIT (OUTPATIENT)
Dept: URGENT CARE | Facility: CLINIC | Age: 40
End: 2025-01-27
Payer: COMMERCIAL

## 2025-01-27 VITALS
HEIGHT: 70 IN | WEIGHT: 313 LBS | HEART RATE: 113 BPM | RESPIRATION RATE: 14 BRPM | DIASTOLIC BLOOD PRESSURE: 96 MMHG | TEMPERATURE: 99.5 F | BODY MASS INDEX: 44.81 KG/M2 | SYSTOLIC BLOOD PRESSURE: 155 MMHG | OXYGEN SATURATION: 97 %

## 2025-01-27 DIAGNOSIS — G43.809 OTHER MIGRAINE WITHOUT STATUS MIGRAINOSUS, NOT INTRACTABLE: Primary | ICD-10-CM

## 2025-01-27 PROCEDURE — 99213 OFFICE O/P EST LOW 20 MIN: CPT | Performed by: PHYSICIAN ASSISTANT

## 2025-01-27 RX ORDER — KETOROLAC TROMETHAMINE 30 MG/ML
15 INJECTION, SOLUTION INTRAMUSCULAR; INTRAVENOUS ONCE
Status: COMPLETED | OUTPATIENT
Start: 2025-01-27 | End: 2025-01-27

## 2025-01-27 RX ADMIN — KETOROLAC TROMETHAMINE 15 MG: 30 INJECTION, SOLUTION INTRAMUSCULAR; INTRAVENOUS at 15:25

## 2025-01-27 NOTE — PROGRESS NOTES
"  [facility]        NAME: Elver Hanks is a 39 y.o. male  : 1985    MRN: [id number]  DATE: 2025  TIME: 3:45 PM    Assessment and Plan   Other migraine without status migrainosus, not intractable [G43.809]  1. Other migraine without status migrainosus, not intractable  ketorolac (TORADOL) injection 15 mg        With worsening symptoms I recommend the patient go to the emergency room.  Toradol given in the office.    Patient Instructions     Patient Instructions   Patient Education     Migraine in adults   The Basics   Written by the doctors and editors at [facility]   What is migraine? -- This is a condition that involves a headache as well as other symptoms.  Migraine can affect both adults and children. It is more common in females. Migraine headaches, or \"attacks,\" often start mild and then get worse.  What are the symptoms of migraine in adults? -- Symptoms can include:   Headache - The headache gets worse over several hours and is usually throbbing. It often affects 1 side of the head.   Nausea and sometimes vomiting   Sensitivity to light and noise - Lying down in a quiet, dark room often helps.   Aura - Some people have something called a migraine \"aura.\" This is a symptom or feeling that happens before or during the migraine attack. The aura usually lasts a few minutes to an hour and then goes away. For most people, it lasts 15 to 30 minutes.  Each person's aura is different, but in most cases, the aura affects your vision. During an aura, you might:   See flashing lights, bright spots, or zigzag lines   Lose part of your vision   Have numbness and tingling of the lips, lower face, and fingers of 1 hand   Hear sounds or have ringing in their ears  People who get migraine with aura usually cannot take birth control pills that have estrogen. That's because they might increase the risk of stroke.  Many people get other symptoms of migraine. These symptoms can happen several hours or " "even a day before the headache. Doctors call these \"premonitory\" or \"prodromal\" symptoms. They might include yawning, feeling depressed, irritability, food cravings, constipation, or a stiff neck.  Will I need tests? -- Probably not. Your doctor or nurse will ask you questions and do an exam.  Can migraine attacks be prevented? -- Yes. Some people find that their migraine attacks are triggered by certain things. If you can avoid some of these things, you can lower your chances of getting migraine attacks.  You can also keep a \"headache diary.\" In the diary, write down:   Every time you have a migraine attack   What you ate and did before it started - This can help you decide if there is anything you should avoid eating or doing.   What medicine you took, and if it helped  Common migraine triggers include:   Stress   Hormonal changes   Skipping meals or not eating enough   Changes in the weather   Sleeping too much or too little   Bright or flashing lights   Drinking alcohol   Eating certain foods, such as aged cheese and hot dogs   Smoking or being around smoke  If your migraine attacks are frequent or severe, your doctor can suggest other ways to help prevent them. For example, it might help to learn relaxation techniques and ways to manage stress. There are also medicines that can help.  Some people get migraine attacks just before or during their period. Medicine can help with this, too.  How are migraine attacks treated? -- There are many different medicines that can help with migraine. Your doctor can help you find the best treatment for your situation.  For mild migraine, your doctor might suggest an over-the-counter medicine to take during a migraine attack. These include:   Acetaminophen (sample brand name: Tylenol)   Ibuprofen (sample brand names: Advil, Motrin)   Naproxen (sample brand name: Aleve)   A medicine that combines acetaminophen, aspirin, and caffeine (sample brand name: Excedrin)  For more " severe migraine, there are prescription medicines that can help. Some, such as medicines called triptans, help relieve the pain from a migraine attack. Other prescription medicines can help make migraine attacks happen less often. If you have severe nausea or vomiting with your migraine attacks, there are medicines that can help with that, too.  Do not try to treat frequent migraine attacks on your own with non-prescription pain medicines. Taking these too often can actually cause more headaches later.  What else can I do to feel better? -- You can try:   Resting in a quiet, dark room with a cool cloth on your forehead   Sleeping   Taking the medicine or medicines that your doctor suggested - Do not take medicines that you have not discussed with your doctor.  What if I want to get pregnant? -- Talk to your doctor or nurse before you start trying. Some medicines used to treat and prevent migraine are not safe during pregnancy, so you might need to switch medicines before you get pregnant.  Some people notice that their migraine symptoms actually get better during pregnancy and breastfeeding. This is related to hormonal changes in the body.  When should I call the doctor? -- Call your doctor or nurse if:    You think that you are having migraine attacks.   Your migraine attacks get worse or more frequent.   You have new symptoms.  All topics are updated as new evidence becomes available and our peer review process is complete.  This topic retrieved from [employer] on: May 18, 2024.  Topic 089664 Version 9.0  Release: 32.4.3 - C32.137  © 2024 UpToDate, Inc. and/or its affiliates. All rights reserved.  Consumer Information Use and Disclaimer   Disclaimer: This generalized information is a limited summary of diagnosis, treatment, and/or medication information. It is not meant to be comprehensive and should be used as a tool to help the user understand and/or assess potential diagnostic and treatment options. It does NOT  include all information about conditions, treatments, medications, side effects, or risks that may apply to a specific patient. It is not intended to be medical advice or a substitute for the medical advice, diagnosis, or treatment of a health care provider based on the health care provider's examination and assessment of a patient's specific and unique circumstances. Patients must speak with a health care provider for complete information about their health, medical questions, and treatment options, including any risks or benefits regarding use of medications. This information does not endorse any treatments or medications as safe, effective, or approved for treating a specific patient. UpToDate, Inc. and its affiliates disclaim any warranty or liability relating to this information or the use thereof.The use of this information is governed by the Terms of Use, available at https://www.[employer] renal/en/know/clinical-effectiveness-terms. 2024© UpToDate, Inc. and its affiliates and/or licensors. All rights reserved.  Copyright   © 2024 UpToDate, Inc. and/or its affiliates. All rights reserved.        Follow up with PCP in 3-5 days.  Proceed to  ER if symptoms worsen.    Chief Complaint     Chief Complaint   Patient presents with    Headache     Headache with hx of migraines         History of Present Illness       The patient is a 39-year-old male presenting today for headache.  Reports he has a history of migraines and had one that started last night. One episode of vomiting this am. Reports needing a work note. Last appt with neuro was a month ago.  Has associated phono and photophobia.  States that this is not his worst migraine.        Review of Systems   Review of Systems   Constitutional:  Negative for activity change, appetite change, chills, diaphoresis and fever.   HENT:  Negative for congestion, ear pain, rhinorrhea and sore throat.    Eyes:  Positive for photophobia. Negative for pain and visual  disturbance.   Respiratory:  Negative for cough, chest tightness and shortness of breath.    Cardiovascular:  Negative for chest pain and palpitations.   Gastrointestinal:  Negative for abdominal pain, diarrhea, nausea and vomiting.   Genitourinary:  Negative for dysuria and hematuria.   Musculoskeletal:  Negative for arthralgias, back pain and myalgias.   Skin:  Negative for color change, pallor and rash.   Neurological:  Positive for dizziness and headaches. Negative for seizures and syncope.   All other systems reviewed and are negative.        Current Medications       Current Outpatient Medications:     acetaminophen (TYLENOL) 650 mg CR tablet, Take 1 tablet (650 mg total) by mouth every 8 (eight) hours as needed for mild pain, Disp: 30 tablet, Rfl: 0    benzonatate (TESSALON PERLES) 100 mg capsule, Take 1 capsule (100 mg total) by mouth every 8 (eight) hours, Disp: 21 capsule, Rfl: 0    buPROPion (WELLBUTRIN XL) 150 mg 24 hr tablet, 450 mg, Disp: , Rfl:     Cholecalciferol 50 MCG (2000 UT) TABS, 50 mcg, Disp: , Rfl:     dicyclomine (BENTYL) 20 mg tablet, Take 1 tablet (20 mg total) by mouth 4 (four) times a day as needed (abdominal discomfort), Disp: 20 tablet, Rfl: 1    escitalopram (LEXAPRO) 20 mg tablet, Take 20 mg by mouth daily, Disp: , Rfl:     famotidine (PEPCID) 20 mg tablet, Take 20 mg by mouth, Disp: , Rfl:     fluticasone (FLONASE) 50 mcg/act nasal spray, 1 spray into each nostril daily, Disp: 16 g, Rfl: 0    guaiFENesin (MUCINEX) 600 mg 12 hr tablet, Take 2 tablets (1,200 mg total) by mouth every 12 (twelve) hours, Disp: 120 tablet, Rfl: 0    hydrOXYzine HCL (ATARAX) 25 mg tablet, , Disp: , Rfl:     ibuprofen (MOTRIN) 600 mg tablet, , Disp: , Rfl:     ibuprofen (MOTRIN) 600 mg tablet, Take 1 tablet (600 mg total) by mouth every 6 (six) hours as needed for mild pain or fever, Disp: 30 tablet, Rfl: 0    Magnesium Oxide 240 MG PACK, Take 420 mg by mouth, Disp: , Rfl:     melatonin 3 mg, 12 mg, Disp: ,  Rfl:     methocarbamol (ROBAXIN) 500 mg tablet, Take 500 mg by mouth, Disp: , Rfl:     Nutritional Supplements (VITAMIN D BOOSTER PO), Take by mouth in the morning, Disp: , Rfl:     ondansetron (ZOFRAN-ODT) 4 mg disintegrating tablet, Take 1 tablet (4 mg total) by mouth every 6 (six) hours as needed for nausea or vomiting, Disp: 12 tablet, Rfl: 0    pantoprazole (PROTONIX) 40 mg tablet, Take 1 tablet (40 mg total) by mouth daily, Disp: 20 tablet, Rfl: 1    prazosin (MINIPRESS) 2 mg capsule, 2 mg, Disp: , Rfl:     pseudoephedrine (SUDAFED) 120 MG 12 hr tablet, Take 1 tablet (120 mg total) by mouth 2 (two) times a day, Disp: 60 tablet, Rfl: 0    sildenafil (VIAGRA) 50 MG tablet, TAKE ONE TABLET BY MOUTH AS DIRECTED FOR ERECTILE DYSFUNCTION 1 HR PRIOR TO SEXUAL ACTIVITY. DO NOT TAKE MORE THAN ONE DOSE IN 24 HOURS., Disp: , Rfl:     topiramate (TOPAMAX) 25 mg tablet, Take 50 mg by mouth, Disp: , Rfl:     topiramate (TOPAMAX) 50 MG tablet, , Disp: , Rfl:     traZODone (DESYREL) 50 mg tablet, Take 1 tablet by mouth daily at bedtime as needed, Disp: , Rfl:   No current facility-administered medications for this visit.    Facility-Administered Medications Ordered in Other Visits:     diphenhydrAMINE (BENADRYL) injection 25 mg, 25 mg, Intravenous, Once, Ester Merida PA-C    magnesium sulfate 2 g/50 mL IVPB (premix) 2 g, 2 g, Intravenous, Once, Ester Merida PA-C    metoclopramide (REGLAN) injection 10 mg, 10 mg, Intravenous, Once, Ester Merida PA-C    sodium chloride 0.9 % bolus 1,500 mL, 1,500 mL, Intravenous, Once, Ester Merida PA-C    Current Allergies     Allergies as of 01/27/2025 - Reviewed 01/27/2025   Allergen Reaction Noted    Reglan [metoclopramide] Other (See Comments) 12/19/2023    Shellfish allergy - food allergy Other (See Comments) 02/29/2024    Shellfish-derived products - food allergy Hives 08/06/2023    Sumatriptan Itching 02/01/2024            The following  "portions of the patient's history were reviewed and updated as appropriate: allergies, current medications, past family history, past medical history, past social history, past surgical history and problem list.     Past Medical History:   Diagnosis Date    Migraine     Psychiatric disorder     PTSD       Past Surgical History:   Procedure Laterality Date    ANTERIOR CRUCIATE LIGAMENT REPAIR Left     APPENDECTOMY      SHOULDER SURGERY Left        History reviewed. No pertinent family history.      Medications have been verified.        Objective   /96   Pulse (!) 113   Temp 99.5 °F (37.5 °C)   Resp 14   Ht 5' 10\" (1.778 m)   Wt (!) 142 kg (313 lb)   SpO2 97%   BMI 44.91 kg/m²        Physical Exam     Physical Exam  Vitals and nursing note reviewed.   Constitutional:       General: He is not in acute distress.     Appearance: Normal appearance. He is normal weight. He is not ill-appearing, toxic-appearing or diaphoretic.   HENT:      Head: Normocephalic and atraumatic.   Eyes:      General: No scleral icterus.        Right eye: No discharge.         Left eye: No discharge.      Extraocular Movements: Extraocular movements intact.      Conjunctiva/sclera: Conjunctivae normal.      Pupils: Pupils are equal, round, and reactive to light.   Cardiovascular:      Rate and Rhythm: Normal rate and regular rhythm.      Heart sounds: Normal heart sounds. No murmur heard.     No friction rub. No gallop.   Pulmonary:      Effort: Pulmonary effort is normal. No respiratory distress.      Breath sounds: Normal breath sounds. No stridor. No wheezing, rhonchi or rales.   Chest:      Chest wall: No tenderness.   Musculoskeletal:         General: Normal range of motion.      Cervical back: Normal range of motion.   Lymphadenopathy:      Cervical: No cervical adenopathy.   Skin:     General: Skin is warm and dry.      Capillary Refill: Capillary refill takes less than 2 seconds.   Neurological:      General: No focal " deficit present.      Mental Status: He is alert and oriented to person, place, and time. Mental status is at baseline.      Cranial Nerves: No cranial nerve deficit.      Sensory: No sensory deficit.      Motor: No weakness.      Coordination: Coordination normal.      Gait: Gait normal.      Deep Tendon Reflexes: Reflexes normal.

## 2025-01-27 NOTE — PATIENT INSTRUCTIONS
"Patient Education     Migraine in adults   The Basics   Written by the doctors and editors at [facility]   What is migraine? -- This is a condition that involves a headache as well as other symptoms.  Migraine can affect both adults and children. It is more common in females. Migraine headaches, or \"attacks,\" often start mild and then get worse.  What are the symptoms of migraine in adults? -- Symptoms can include:   Headache - The headache gets worse over several hours and is usually throbbing. It often affects 1 side of the head.   Nausea and sometimes vomiting   Sensitivity to light and noise - Lying down in a quiet, dark room often helps.   Aura - Some people have something called a migraine \"aura.\" This is a symptom or feeling that happens before or during the migraine attack. The aura usually lasts a few minutes to an hour and then goes away. For most people, it lasts 15 to 30 minutes.  Each person's aura is different, but in most cases, the aura affects your vision. During an aura, you might:   See flashing lights, bright spots, or zigzag lines   Lose part of your vision   Have numbness and tingling of the lips, lower face, and fingers of 1 hand   Hear sounds or have ringing in their ears  People who get migraine with aura usually cannot take birth control pills that have estrogen. That's because they might increase the risk of stroke.  Many people get other symptoms of migraine. These symptoms can happen several hours or even a day before the headache. Doctors call these \"premonitory\" or \"prodromal\" symptoms. They might include yawning, feeling depressed, irritability, food cravings, constipation, or a stiff neck.  Will I need tests? -- Probably not. Your doctor or nurse will ask you questions and do an exam.  Can migraine attacks be prevented? -- Yes. Some people find that their migraine attacks are triggered by certain things. If you can avoid some of these things, you can lower your chances of getting " "migraine attacks.  You can also keep a \"headache diary.\" In the diary, write down:   Every time you have a migraine attack   What you ate and did before it started - This can help you decide if there is anything you should avoid eating or doing.   What medicine you took, and if it helped  Common migraine triggers include:   Stress   Hormonal changes   Skipping meals or not eating enough   Changes in the weather   Sleeping too much or too little   Bright or flashing lights   Drinking alcohol   Eating certain foods, such as aged cheese and hot dogs   Smoking or being around smoke  If your migraine attacks are frequent or severe, your doctor can suggest other ways to help prevent them. For example, it might help to learn relaxation techniques and ways to manage stress. There are also medicines that can help.  Some people get migraine attacks just before or during their period. Medicine can help with this, too.  How are migraine attacks treated? -- There are many different medicines that can help with migraine. Your doctor can help you find the best treatment for your situation.  For mild migraine, your doctor might suggest an over-the-counter medicine to take during a migraine attack. These include:   Acetaminophen (sample brand name: Tylenol)   Ibuprofen (sample brand names: Advil, Motrin)   Naproxen (sample brand name: Aleve)   A medicine that combines acetaminophen, aspirin, and caffeine (sample brand name: Excedrin)  For more severe migraine, there are prescription medicines that can help. Some, such as medicines called triptans, help relieve the pain from a migraine attack. Other prescription medicines can help make migraine attacks happen less often. If you have severe nausea or vomiting with your migraine attacks, there are medicines that can help with that, too.  Do not try to treat frequent migraine attacks on your own with non-prescription pain medicines. Taking these too often can actually cause more " headaches later.  What else can I do to feel better? -- You can try:   Resting in a quiet, dark room with a cool cloth on your forehead   Sleeping   Taking the medicine or medicines that your doctor suggested - Do not take medicines that you have not discussed with your doctor.  What if I want to get pregnant? -- Talk to your doctor or nurse before you start trying. Some medicines used to treat and prevent migraine are not safe during pregnancy, so you might need to switch medicines before you get pregnant.  Some people notice that their migraine symptoms actually get better during pregnancy and breastfeeding. This is related to hormonal changes in the body.  When should I call the doctor? -- Call your doctor or nurse if:    You think that you are having migraine attacks.   Your migraine attacks get worse or more frequent.   You have new symptoms.  All topics are updated as new evidence becomes available and our peer review process is complete.  This topic retrieved from [employer] on: May 18, 2024.  Topic 847613 Version 9.0  Release: 32.4.3 - C32.137  © 2024 UpToDate, Inc. and/or its affiliates. All rights reserved.  Consumer Information Use and Disclaimer   Disclaimer: This generalized information is a limited summary of diagnosis, treatment, and/or medication information. It is not meant to be comprehensive and should be used as a tool to help the user understand and/or assess potential diagnostic and treatment options. It does NOT include all information about conditions, treatments, medications, side effects, or risks that may apply to a specific patient. It is not intended to be medical advice or a substitute for the medical advice, diagnosis, or treatment of a health care provider based on the health care provider's examination and assessment of a patient's specific and unique circumstances. Patients must speak with a health care provider for complete information about their health, medical questions, and  treatment options, including any risks or benefits regarding use of medications. This information does not endorse any treatments or medications as safe, effective, or approved for treating a specific patient. UpToDate, Inc. and its affiliates disclaim any warranty or liability relating to this information or the use thereof.The use of this information is governed by the Terms of Use, available at https://www.[employer].com/en/know/clinical-effectiveness-terms. 2024© UpToDate, Inc. and its affiliates and/or licensors. All rights reserved.  Copyright   © 2024 UpToDate, Inc. and/or its affiliates. All rights reserved.

## 2025-01-27 NOTE — LETTER
January 27, 2025     Patient: Elver Hanks  YOB: 1985  Date of Visit: 1/27/2025      To Whom it May Concern:    Elver Hanks is under my professional care. Elver was seen in my office on 1/27/2025. Elver may return to work on 1/29 .    If you have any questions or concerns, please don't hesitate to call.         Sincerely,          Sarah Zamorano PA-C        CC: No Recipients

## 2025-02-17 ENCOUNTER — OFFICE VISIT (OUTPATIENT)
Dept: URGENT CARE | Facility: CLINIC | Age: 40
End: 2025-02-17
Payer: COMMERCIAL

## 2025-02-17 VITALS
WEIGHT: 315 LBS | SYSTOLIC BLOOD PRESSURE: 122 MMHG | HEART RATE: 118 BPM | RESPIRATION RATE: 16 BRPM | TEMPERATURE: 98.9 F | BODY MASS INDEX: 45.1 KG/M2 | DIASTOLIC BLOOD PRESSURE: 80 MMHG | OXYGEN SATURATION: 93 % | HEIGHT: 70 IN

## 2025-02-17 DIAGNOSIS — G43.819 OTHER MIGRAINE WITHOUT STATUS MIGRAINOSUS, INTRACTABLE: Primary | ICD-10-CM

## 2025-02-17 PROCEDURE — 99213 OFFICE O/P EST LOW 20 MIN: CPT | Performed by: PHYSICIAN ASSISTANT

## 2025-02-17 RX ORDER — ONDANSETRON 4 MG/1
4 TABLET, FILM COATED ORAL EVERY 8 HOURS PRN
Qty: 20 TABLET | Refills: 0 | Status: SHIPPED | OUTPATIENT
Start: 2025-02-17

## 2025-02-17 RX ORDER — DEXAMETHASONE SODIUM PHOSPHATE 4 MG/ML
10 INJECTION, SOLUTION INTRA-ARTICULAR; INTRALESIONAL; INTRAMUSCULAR; INTRAVENOUS; SOFT TISSUE ONCE
Status: COMPLETED | OUTPATIENT
Start: 2025-02-17 | End: 2025-02-17

## 2025-02-17 RX ORDER — KETOROLAC TROMETHAMINE 30 MG/ML
15 INJECTION, SOLUTION INTRAMUSCULAR; INTRAVENOUS ONCE
Status: COMPLETED | OUTPATIENT
Start: 2025-02-17 | End: 2025-02-17

## 2025-02-17 RX ORDER — DEXAMETHASONE SODIUM PHOSPHATE 10 MG/ML
10 INJECTION INTRAMUSCULAR; INTRAVENOUS ONCE
Status: DISCONTINUED | OUTPATIENT
Start: 2025-02-17 | End: 2025-02-17

## 2025-02-17 RX ORDER — ONDANSETRON 4 MG/1
4 TABLET, ORALLY DISINTEGRATING ORAL ONCE
Status: COMPLETED | OUTPATIENT
Start: 2025-02-17 | End: 2025-02-17

## 2025-02-17 RX ADMIN — KETOROLAC TROMETHAMINE 15 MG: 30 INJECTION, SOLUTION INTRAMUSCULAR; INTRAVENOUS at 14:38

## 2025-02-17 RX ADMIN — ONDANSETRON 4 MG: 4 TABLET, ORALLY DISINTEGRATING ORAL at 14:36

## 2025-02-17 RX ADMIN — DEXAMETHASONE SODIUM PHOSPHATE 10 MG: 4 INJECTION, SOLUTION INTRA-ARTICULAR; INTRALESIONAL; INTRAMUSCULAR; INTRAVENOUS; SOFT TISSUE at 15:08

## 2025-02-17 NOTE — LETTER
February 17, 2025     Patient: Elver Hanks  YOB: 1985  Date of Visit: 2/17/2025      To Whom it May Concern:    Elver Hanks is under my professional care. Elver was seen in my office on 2/17/2025. Elver return to work 2/18/2025.  Please excuse.    If you have any questions or concerns, please don't hesitate to call.         Sincerely,          Sarah Zamorano PA-C        CC: No Recipients

## 2025-02-17 NOTE — PROGRESS NOTES
[facility]  Name: Elver Hanks      : 1985      MRN: [id number]  Encounter Provider: Sarah Zamorano PA-C  Encounter Date: 2025   Encounter department: [facility] [place]  :  Assessment & Plan  Other migraine without status migrainosus, intractable    Orders:    Ambulatory Referral to Neurology; Future    ketorolac (TORADOL) injection 15 mg    ondansetron (ZOFRAN) 4 mg tablet; Take 1 tablet (4 mg total) by mouth every 8 (eight) hours as needed for nausea or vomiting    ondansetron (ZOFRAN-ODT) dispersible tablet 4 mg    dexamethasone (DECADRON) injection 10 mg        Patient Instructions  Follow up with PCP in 3-5 days.  Proceed to  ER if symptoms worsen.    If tests are performed, our office will contact you with results only if changes need to made to the care plan discussed with you at the visit. [facility]    Chief Complaint:   Chief Complaint   Patient presents with    Back Pain     Back pain, migraine, nausea and dizziness started yesterday. No BEVERLY.      History of Present Illness   The patient is a 39 year old male presenting for a one day history of a migraine and lower back pain. He stated that he has had migraines before and that his migraine medication or Tylenol has not helped.  Reports that he takes a migraine medicine but is not sure which 1.  [facility] which [facility].  Was seen here on  for a migraine.  Prior to that he has been seen in the ED for migraines.  He also reports photophobia and phonophobia.  Denies vision changes.  He confirms feeling nausea but has not vomited.  Does report feeling dizzy.  He reported the back pain started last night and began while he was laying down and that the pain is mostly midline but radiates laterally on both side.     Back Pain  Associated symptoms include headaches. Pertinent negatives include no chest pain or fever.      History obtained from: patient    Review of Systems   Constitutional:  Negative for chills and fever.   HENT:  Negative for congestion, rhinorrhea and tinnitus.    Eyes:  Positive for photophobia.   Respiratory:  Negative for cough and shortness of breath.    Cardiovascular:  Negative for chest pain.   Gastrointestinal:  Positive for nausea. Negative for vomiting.   Musculoskeletal:  Positive for back pain.   Skin:  Negative for color change and rash.   Neurological:  Positive for dizziness and headaches. Negative for syncope.     Past Medical History   Past Medical History:   Diagnosis Date    Migraine     Psychiatric disorder     PTSD     Past Surgical History:   Procedure Laterality Date    ANTERIOR CRUCIATE LIGAMENT REPAIR Left     APPENDECTOMY      SHOULDER SURGERY Left      History reviewed. No pertinent family history.  he reports that he has quit smoking. His smoking use included cigarettes. He does not have any smokeless tobacco history on file. He reports current alcohol use. He reports that he does not use drugs.  Current Outpatient Medications   Medication Instructions    acetaminophen (TYLENOL) 650 mg, Oral, Every 8 hours PRN    benzonatate (TESSALON PERLES) 100 mg, Oral, Every 8 hours    buPROPion (WELLBUTRIN XL) 450 mg    Cholecalciferol 50 mcg    dicyclomine (BENTYL) 20 mg, Oral, 4 times daily PRN    escitalopram (LEXAPRO) 20 mg, Daily    famotidine (PEPCID) 20 mg    fluticasone (FLONASE) 50 mcg/act nasal spray 1 spray, Nasal, Daily    guaiFENesin (MUCINEX) 1,200 mg, Oral, Every 12 hours scheduled    hydrOXYzine HCL (ATARAX) 25 mg tablet     ibuprofen (MOTRIN) 600 mg tablet     ibuprofen (MOTRIN) 600 mg, Oral, Every 6 hours PRN    Magnesium Oxide 420 mg    melatonin 12 mg    methocarbamol (ROBAXIN) 500 mg    Nutritional Supplements (VITAMIN D BOOSTER PO) Daily    ondansetron (ZOFRAN) 4 mg, Oral, Every 8 hours PRN    ondansetron (ZOFRAN-ODT) 4 mg, Oral, Every 6 hours PRN    pantoprazole (PROTONIX)  "40 mg, Oral, Daily    prazosin (MINIPRESS) 2 mg    pseudoephedrine (SUDAFED) 120 mg, Oral, 2 times daily    sildenafil (VIAGRA) 50 MG tablet TAKE ONE TABLET BY MOUTH AS DIRECTED FOR ERECTILE DYSFUNCTION 1 HR PRIOR TO SEXUAL ACTIVITY. DO NOT TAKE MORE THAN ONE DOSE IN 24 HOURS.    topiramate (TOPAMAX) 50 MG tablet     topiramate (TOPAMAX) 50 mg    traZODone (DESYREL) 50 mg tablet 1 tablet, Oral, Daily at bedtime PRN     Allergies   Allergen Reactions    Reglan [Metoclopramide] Other (See Comments)     jittery    Shellfish Allergy - Food Allergy Other (See Comments)    Shellfish-Derived Products - Food Allergy Hives    Sumatriptan Itching      Objective   /80   Pulse (!) 118   Temp 98.9 °F (37.2 °C)   Resp 16   Ht 5' 10\" (1.778 m)   Wt (!) 145 kg (319 lb)   SpO2 93%   BMI 45.77 kg/m²      Physical Exam  Constitutional:       General: He is not in acute distress.     Appearance: Normal appearance.   HENT:      Head: Normocephalic and atraumatic.      Right Ear: Tympanic membrane, ear canal and external ear normal.      Left Ear: Tympanic membrane, ear canal and external ear normal.      Nose: Nose normal.      Mouth/Throat:      Mouth: Mucous membranes are moist.      Pharynx: Oropharynx is clear.   Eyes:      General: Lids are normal.         Right eye: No discharge.         Left eye: No discharge.      Extraocular Movements: Extraocular movements intact.      Right eye: No nystagmus.      Left eye: No nystagmus.      Conjunctiva/sclera: Conjunctivae normal.      Pupils: Pupils are equal, round, and reactive to light.   Cardiovascular:      Rate and Rhythm: Normal rate and regular rhythm.   Pulmonary:      Effort: Pulmonary effort is normal. No respiratory distress.      Breath sounds: Normal breath sounds.   Abdominal:      General: Bowel sounds are normal. There is no distension.      Palpations: Abdomen is soft.   Musculoskeletal:         General: Tenderness (low back) present. No swelling, deformity " or signs of injury.      Cervical back: Normal and normal range of motion.      Thoracic back: Normal.      Lumbar back: Tenderness present. No swelling, edema, deformity or signs of trauma. Decreased range of motion.      Comments: Pain with flexion and extension   Skin:     General: Skin is warm and dry.      Capillary Refill: Capillary refill takes less than 2 seconds.   Neurological:      Mental Status: He is alert and oriented to person, place, and time.

## 2025-02-17 NOTE — LETTER
February 17, 2025     Patient: Elver Hanks  YOB: 1985  Date of Visit: 2/17/2025      To Whom it May Concern:    Elver Hanks is under my professional care. Elver was seen in my office on 2/17/2025. Please excuse.    If you have any questions or concerns, please don't hesitate to call.         Sincerely,          Sarah Zamorano PA-C        CC: No Recipients

## 2025-02-17 NOTE — PATIENT INSTRUCTIONS
"The Mag oxide 400 mg at night.  B2 400 mg during the day.      Patient Education     Migraine in adults   The Basics   Written by the doctors and editors at [facility]   What is migraine? -- This is a condition that involves a headache as well as other symptoms.  Migraine can affect both adults and children. It is more common in females. Migraine headaches, or \"attacks,\" often start mild and then get worse.  What are the symptoms of migraine in adults? -- Symptoms can include:   Headache - The headache gets worse over several hours and is usually throbbing. It often affects 1 side of the head.   Nausea and sometimes vomiting   Sensitivity to light and noise - Lying down in a quiet, dark room often helps.   Aura - Some people have something called a migraine \"aura.\" This is a symptom or feeling that happens before or during the migraine attack. The aura usually lasts a few minutes to an hour and then goes away. For most people, it lasts 15 to 30 minutes.  Each person's aura is different, but in most cases, the aura affects your vision. During an aura, you might:   See flashing lights, bright spots, or zigzag lines   Lose part of your vision   Have numbness and tingling of the lips, lower face, and fingers of 1 hand   Hear sounds or have ringing in their ears  People who get migraine with aura usually cannot take birth control pills that have estrogen. That's because they might increase the risk of stroke.  Many people get other symptoms of migraine. These symptoms can happen several hours or even a day before the headache. Doctors call these \"premonitory\" or \"prodromal\" symptoms. They might include yawning, feeling depressed, irritability, food cravings, constipation, or a stiff neck.  Will I need tests? -- Probably not. Your doctor or nurse will ask you questions and do an exam.  Can migraine attacks be prevented? -- Yes. Some people find that their migraine attacks are triggered by certain things. If you can avoid " "some of these things, you can lower your chances of getting migraine attacks.  You can also keep a \"headache diary.\" In the diary, write down:   Every time you have a migraine attack   What you ate and did before it started - This can help you decide if there is anything you should avoid eating or doing.   What medicine you took, and if it helped  Common migraine triggers include:   Stress   Hormonal changes   Skipping meals or not eating enough   Changes in the weather   Sleeping too much or too little   Bright or flashing lights   Drinking alcohol   Eating certain foods, such as aged cheese and hot dogs   Smoking or being around smoke  If your migraine attacks are frequent or severe, your doctor can suggest other ways to help prevent them. For example, it might help to learn relaxation techniques and ways to manage stress. There are also medicines that can help.  Some people get migraine attacks just before or during their period. Medicine can help with this, too.  How are migraine attacks treated? -- There are many different medicines that can help with migraine. Your doctor can help you find the best treatment for your situation.  For mild migraine, your doctor might suggest an over-the-counter medicine to take during a migraine attack. These include:   Acetaminophen (sample brand name: Tylenol)   Ibuprofen (sample brand names: Advil, Motrin)   Naproxen (sample brand name: Aleve)   A medicine that combines acetaminophen, aspirin, and caffeine (sample brand name: Excedrin)  For more severe migraine, there are prescription medicines that can help. Some, such as medicines called triptans, help relieve the pain from a migraine attack. Other prescription medicines can help make migraine attacks happen less often. If you have severe nausea or vomiting with your migraine attacks, there are medicines that can help with that, too.  Do not try to treat frequent migraine attacks on your own with non-prescription pain " medicines. Taking these too often can actually cause more headaches later.  What else can I do to feel better? -- You can try:   Resting in a quiet, dark room with a cool cloth on your forehead   Sleeping   Taking the medicine or medicines that your doctor suggested - Do not take medicines that you have not discussed with your doctor.  What if I want to get pregnant? -- Talk to your doctor or nurse before you start trying. Some medicines used to treat and prevent migraine are not safe during pregnancy, so you might need to switch medicines before you get pregnant.  Some people notice that their migraine symptoms actually get better during pregnancy and breastfeeding. This is related to hormonal changes in the body.  When should I call the doctor? -- Call your doctor or nurse if:    You think that you are having migraine attacks.   Your migraine attacks get worse or more frequent.   You have new symptoms.  All topics are updated as new evidence becomes available and our peer review process is complete.  This topic retrieved from [employer] on: May 18, 2024.  Topic 611195 Version 9.0  Release: 32.4.3 - C32.137  © 2024 UpToDate, Inc. and/or its affiliates. All rights reserved.  Consumer Information Use and Disclaimer   Disclaimer: This generalized information is a limited summary of diagnosis, treatment, and/or medication information. It is not meant to be comprehensive and should be used as a tool to help the user understand and/or assess potential diagnostic and treatment options. It does NOT include all information about conditions, treatments, medications, side effects, or risks that may apply to a specific patient. It is not intended to be medical advice or a substitute for the medical advice, diagnosis, or treatment of a health care provider based on the health care provider's examination and assessment of a patient's specific and unique circumstances. Patients must speak with a health care provider for complete  information about their health, medical questions, and treatment options, including any risks or benefits regarding use of medications. This information does not endorse any treatments or medications as safe, effective, or approved for treating a specific patient. UpToDate, Inc. and its affiliates disclaim any warranty or liability relating to this information or the use thereof.The use of this information is governed by the Terms of Use, available at https://www.woltersRegenuwer.com/en/know/clinical-effectiveness-terms. 2024© UpToDate, Inc. and its affiliates and/or licensors. All rights reserved.  Copyright   © 2024 UpToDate, Inc. and/or its affiliates. All rights reserved.

## 2025-02-27 ENCOUNTER — TELEPHONE (OUTPATIENT)
Age: 40
End: 2025-02-27

## 2025-02-27 NOTE — TELEPHONE ENCOUNTER
Call made to patient to see if he would like to come in for sooner appointment, due to him being at work he is unable to come in today.     I was able to move his 3/19/25 appointment up to 3/10/25 at 3 pm, patient was very thankful for the call and time.

## 2025-03-23 ENCOUNTER — HOSPITAL ENCOUNTER (EMERGENCY)
Facility: HOSPITAL | Age: 40
Discharge: HOME | End: 2025-03-23
Attending: EMERGENCY MEDICINE | Admitting: EMERGENCY MEDICINE
Payer: COMMERCIAL

## 2025-03-23 VITALS
BODY MASS INDEX: 45.94 KG/M2 | WEIGHT: 315 LBS | OXYGEN SATURATION: 95 % | TEMPERATURE: 97.6 F | SYSTOLIC BLOOD PRESSURE: 164 MMHG | HEART RATE: 113 BPM | DIASTOLIC BLOOD PRESSURE: 79 MMHG | RESPIRATION RATE: 20 BRPM

## 2025-03-23 DIAGNOSIS — R11.0 CHRONIC NAUSEA: ICD-10-CM

## 2025-03-23 DIAGNOSIS — M54.50 LOW BACK PAIN: Primary | ICD-10-CM

## 2025-03-23 DIAGNOSIS — Z02.89 ENCOUNTER TO OBTAIN EXCUSE FROM WORK: ICD-10-CM

## 2025-03-23 PROCEDURE — 99284 EMERGENCY DEPT VISIT MOD MDM: CPT | Performed by: EMERGENCY MEDICINE

## 2025-03-23 PROCEDURE — 99282 EMERGENCY DEPT VISIT SF MDM: CPT

## 2025-03-23 RX ORDER — ONDANSETRON 4 MG/1
4 TABLET, FILM COATED ORAL EVERY 6 HOURS PRN
Qty: 12 TABLET | Refills: 0 | Status: SHIPPED | OUTPATIENT
Start: 2025-03-23

## 2025-03-23 NOTE — Clinical Note
Elver Hanks was seen and treated in our emergency department on 3/23/2025.                Diagnosis:     Elver  may return to work on return date.    He may return on this date: 03/24/2025         If you have any questions or concerns, please don't hesitate to call.      Brandy Price MD    ______________________________           _______________          _______________  Hospital Representative                              Date                                Time

## 2025-03-23 NOTE — ED PROVIDER NOTES
Time reflects when diagnosis was documented in both MDM as applicable and the Disposition within this note       Time User Action Codes Description Comment    3/23/2025  3:36 PM Brandy Price Add [R11.0] Chronic nausea     3/23/2025  3:36 PM Brandy Price Add [M54.50] Low back pain     3/23/2025  3:36 PM Brandy Price Add [Z02.89] Encounter to obtain excuse from work     3/23/2025  3:36 PM Brandy Price Modify [R11.0] Chronic nausea     3/23/2025  3:36 PM Brandy Price Modify [M54.50] Low back pain           ED Disposition       ED Disposition   Discharge    Condition   Stable    Date/Time   Sun Mar 23, 2025  3:36 PM    Comment   Elver Hanks discharge to home/self care.                   Assessment & Plan       Medical Decision Making  Pt is a 40yo M who presents with back pain.     Will provide with work note for back pain. No red flag symptoms. In regards to nausea with eating, discussed with pt that we can obtain basic labs but will inevitably require GI follow-up. Pt opting for just GI follow-up. Will also send Zofran for symptom control. Pt agreeable to plan.     Plan to discharge pt with f/u to PCP, GI, and comprehensive spine. Discussed returning the ED with new or worsening of symptoms. Discussed use of over the counter medications as stated on the bottle as needed for pain. Discussed taking new medication as prescribed as needed. Pt expressed understanding of discharge instructions, return precautions, and medication instructions and is stable for discharge at this time. All questions were answered and pt was discharged without incident.         Risk  Prescription drug management.             Medications - No data to display    ED Risk Strat Scores                            SBIRT 22yo+      Flowsheet Row Most Recent Value   Initial Alcohol Screen: US AUDIT-C     1. How often do you have a drink containing alcohol? 0 Filed at: 03/23/2025 1520   3a. Male UNDER 65: How often do you have  five or more drinks on one occasion? 0 Filed at: 03/23/2025 1520   Audit-C Score 0 Filed at: 03/23/2025 1520   REUBEN: How many times in the past year have you...    Used an illegal drug or used a prescription medication for non-medical reasons? Never Filed at: 03/23/2025 1520                            History of Present Illness       Chief Complaint   Patient presents with    Back Pain     States low back pain for 3 days.        Past Medical History:   Diagnosis Date    Migraine     Psychiatric disorder     PTSD      Past Surgical History:   Procedure Laterality Date    ANTERIOR CRUCIATE LIGAMENT REPAIR Left     APPENDECTOMY      SHOULDER SURGERY Left       History reviewed. No pertinent family history.   Social History     Tobacco Use    Smoking status: Former     Types: Cigarettes    Smokeless tobacco: Never   Vaping Use    Vaping status: Never Used   Substance Use Topics    Alcohol use: Never    Drug use: Never      E-Cigarette/Vaping    E-Cigarette Use Never User       E-Cigarette/Vaping Substances    Nicotine No     THC No     CBD No     Flavoring No     Other No     Unknown No       I have reviewed and agree with the history as documented.     Pt is a 38yo M who presents for work note.  Patient reports for the past 3 days he has been having low back pain.  Patient reports he had an injury to his back years ago and intermittently has flares of more severe pain.  Patient reports that the pain is now improving and he is here as he needs a note to return to work tomorrow.  Patient denies any sensory or motor deficits.  Patient denies any incontinence of bladder or bowel.  Patient denies any saddle anesthesia.  Patient denies any recent trauma to his back.  Patient also reports that he has nausea with eating and has had this problem for many years.          Objective       ED Triage Vitals [03/23/25 1517]   Temperature Pulse Blood Pressure Respirations SpO2 Patient Position - Orthostatic VS   97.6 °F (36.4 °C) (!)  113 164/79 20 95 % Sitting      Temp Source Heart Rate Source BP Location FiO2 (%) Pain Score    Tympanic Monitor Left arm -- 7      Vitals      Date and Time Temp Pulse SpO2 Resp BP Pain Score FACES Pain Rating User   03/23/25 1517 97.6 °F (36.4 °C) 113 95 % 20 164/79 7 --             Physical Exam  Vitals reviewed.   Constitutional:       General: He is not in acute distress.     Appearance: He is well-developed. He is obese. He is not toxic-appearing or diaphoretic.   HENT:      Head: Normocephalic and atraumatic.      Right Ear: External ear normal.      Left Ear: External ear normal.      Nose: Nose normal.      Mouth/Throat:      Pharynx: Oropharynx is clear.   Eyes:      Extraocular Movements: Extraocular movements intact.      Pupils: Pupils are equal, round, and reactive to light.   Cardiovascular:      Rate and Rhythm: Normal rate and regular rhythm.      Heart sounds: Normal heart sounds.   Pulmonary:      Effort: Pulmonary effort is normal. No respiratory distress.      Breath sounds: Normal breath sounds.   Abdominal:      General: Bowel sounds are normal. There is no distension.      Palpations: Abdomen is soft.      Tenderness: There is no abdominal tenderness.   Musculoskeletal:         General: Normal range of motion.      Cervical back: Normal range of motion and neck supple.   Skin:     General: Skin is warm and dry.      Capillary Refill: Capillary refill takes less than 2 seconds.      Coloration: Skin is not pale.   Neurological:      General: No focal deficit present.      Mental Status: He is alert and oriented to person, place, and time.      Sensory: No sensory deficit.      Comments: CMS intact distally   Psychiatric:         Speech: Speech normal.         Behavior: Behavior is cooperative.         Results Reviewed       None            No orders to display       Procedures    ED Medication and Procedure Management   Prior to Admission Medications   Prescriptions Last Dose Informant  Patient Reported? Taking?   Cholecalciferol 50 MCG (2000) TABS   Yes No   Si mcg   Magnesium Oxide 240 MG PACK   Yes No   Sig: Take 420 mg by mouth   Nutritional Supplements (VITAMIN D BOOSTER PO)   Yes No   Sig: Take by mouth in the morning   acetaminophen (TYLENOL) 650 mg CR tablet   No No   Sig: Take 1 tablet (650 mg total) by mouth every 8 (eight) hours as needed for mild pain   benzonatate (TESSALON PERLES) 100 mg capsule   No No   Sig: Take 1 capsule (100 mg total) by mouth every 8 (eight) hours   buPROPion (WELLBUTRIN XL) 150 mg 24 hr tablet   Yes No   Si mg   dicyclomine (BENTYL) 20 mg tablet   No No   Sig: Take 1 tablet (20 mg total) by mouth 4 (four) times a day as needed (abdominal discomfort)   escitalopram (LEXAPRO) 20 mg tablet   Yes No   Sig: Take 20 mg by mouth daily   famotidine (PEPCID) 20 mg tablet   Yes No   Sig: Take 20 mg by mouth   fluticasone (FLONASE) 50 mcg/act nasal spray   No No   Si spray into each nostril daily   guaiFENesin (MUCINEX) 600 mg 12 hr tablet   No No   Sig: Take 2 tablets (1,200 mg total) by mouth every 12 (twelve) hours   hydrOXYzine HCL (ATARAX) 25 mg tablet   Yes No   ibuprofen (MOTRIN) 600 mg tablet   Yes No   ibuprofen (MOTRIN) 600 mg tablet   No No   Sig: Take 1 tablet (600 mg total) by mouth every 6 (six) hours as needed for mild pain or fever   melatonin 3 mg   Yes No   Si mg   methocarbamol (ROBAXIN) 500 mg tablet   Yes No   Sig: Take 500 mg by mouth   ondansetron (ZOFRAN) 4 mg tablet   No No   Sig: Take 1 tablet (4 mg total) by mouth every 8 (eight) hours as needed for nausea or vomiting   ondansetron (ZOFRAN-ODT) 4 mg disintegrating tablet   No No   Sig: Take 1 tablet (4 mg total) by mouth every 6 (six) hours as needed for nausea or vomiting   pantoprazole (PROTONIX) 40 mg tablet   No No   Sig: Take 1 tablet (40 mg total) by mouth daily   prazosin (MINIPRESS) 2 mg capsule   Yes No   Si mg   pseudoephedrine (SUDAFED) 120 MG 12 hr tablet    No No   Sig: Take 1 tablet (120 mg total) by mouth 2 (two) times a day   sildenafil (VIAGRA) 50 MG tablet   Yes No   Sig: TAKE ONE TABLET BY MOUTH AS DIRECTED FOR ERECTILE DYSFUNCTION 1 HR PRIOR TO SEXUAL ACTIVITY. DO NOT TAKE MORE THAN ONE DOSE IN 24 HOURS.   topiramate (TOPAMAX) 25 mg tablet   Yes No   Sig: Take 50 mg by mouth   Patient not taking: Reported on 2/17/2025   topiramate (TOPAMAX) 50 MG tablet   Yes No   traZODone (DESYREL) 50 mg tablet   Yes No   Sig: Take 1 tablet by mouth daily at bedtime as needed      Facility-Administered Medications: None     Discharge Medication List as of 3/23/2025  3:38 PM        START taking these medications    Details   !! ondansetron (ZOFRAN) 4 mg tablet Take 1 tablet (4 mg total) by mouth every 6 (six) hours as needed for nausea, Starting Sun 3/23/2025, Normal       !! - Potential duplicate medications found. Please discuss with provider.        CONTINUE these medications which have NOT CHANGED    Details   acetaminophen (TYLENOL) 650 mg CR tablet Take 1 tablet (650 mg total) by mouth every 8 (eight) hours as needed for mild pain, Starting Wed 1/22/2025, Normal      benzonatate (TESSALON PERLES) 100 mg capsule Take 1 capsule (100 mg total) by mouth every 8 (eight) hours, Starting Wed 12/11/2024, Normal      buPROPion (WELLBUTRIN XL) 150 mg 24 hr tablet 450 mg, Starting Fri 4/12/2024, Historical Med      Cholecalciferol 50 MCG (2000 UT) TABS 50 mcg, Starting Fri 7/14/2023, Historical Med      dicyclomine (BENTYL) 20 mg tablet Take 1 tablet (20 mg total) by mouth 4 (four) times a day as needed (abdominal discomfort), Starting Tue 4/30/2024, Normal      escitalopram (LEXAPRO) 20 mg tablet Take 20 mg by mouth daily, Historical Med      famotidine (PEPCID) 20 mg tablet Take 20 mg by mouth, Historical Med      fluticasone (FLONASE) 50 mcg/act nasal spray 1 spray into each nostril daily, Starting Thu 11/16/2023, Normal      guaiFENesin (MUCINEX) 600 mg 12 hr tablet Take 2  tablets (1,200 mg total) by mouth every 12 (twelve) hours, Starting Wed 1/22/2025, Normal      hydrOXYzine HCL (ATARAX) 25 mg tablet Historical Med      !! ibuprofen (MOTRIN) 600 mg tablet Historical Med      !! ibuprofen (MOTRIN) 600 mg tablet Take 1 tablet (600 mg total) by mouth every 6 (six) hours as needed for mild pain or fever, Starting Wed 1/22/2025, Normal      Magnesium Oxide 240 MG PACK Take 420 mg by mouth, Historical Med      melatonin 3 mg 12 mg, Starting Fri 4/12/2024, Historical Med      methocarbamol (ROBAXIN) 500 mg tablet Take 500 mg by mouth, Historical Med      Nutritional Supplements (VITAMIN D BOOSTER PO) Take by mouth in the morning, Historical Med      !! ondansetron (ZOFRAN) 4 mg tablet Take 1 tablet (4 mg total) by mouth every 8 (eight) hours as needed for nausea or vomiting, Starting Mon 2/17/2025, Normal      ondansetron (ZOFRAN-ODT) 4 mg disintegrating tablet Take 1 tablet (4 mg total) by mouth every 6 (six) hours as needed for nausea or vomiting, Starting Wed 11/20/2024, Normal      pantoprazole (PROTONIX) 40 mg tablet Take 1 tablet (40 mg total) by mouth daily, Starting Tue 4/30/2024, Normal      prazosin (MINIPRESS) 2 mg capsule 2 mg, Starting Fri 4/12/2024, Historical Med      pseudoephedrine (SUDAFED) 120 MG 12 hr tablet Take 1 tablet (120 mg total) by mouth 2 (two) times a day, Starting Wed 1/22/2025, Normal      sildenafil (VIAGRA) 50 MG tablet TAKE ONE TABLET BY MOUTH AS DIRECTED FOR ERECTILE DYSFUNCTION 1 HR PRIOR TO SEXUAL ACTIVITY. DO NOT TAKE MORE THAN ONE DOSE IN 24 HOURS., Historical Med      !! topiramate (TOPAMAX) 25 mg tablet Take 50 mg by mouth, Starting Fri 2/16/2024, Historical Med      !! topiramate (TOPAMAX) 50 MG tablet Historical Med      traZODone (DESYREL) 50 mg tablet Take 1 tablet by mouth daily at bedtime as needed, Starting Tue 1/10/2023, Until Thu 11/30/2023 at 2359, Historical Med       !! - Potential duplicate medications found. Please discuss with  provider.        No discharge procedures on file.  ED SEPSIS DOCUMENTATION   Time reflects when diagnosis was documented in both MDM as applicable and the Disposition within this note       Time User Action Codes Description Comment    3/23/2025  3:36 PM Brandy Price Add [R11.0] Chronic nausea     3/23/2025  3:36 PM Brandy Price Add [M54.50] Low back pain     3/23/2025  3:36 PM Brandy Price Add [Z02.89] Encounter to obtain excuse from work     3/23/2025  3:36 PM Brandy Price Modify [R11.0] Chronic nausea     3/23/2025  3:36 PM Brandy Price Modify [M54.50] Low back pain                  Brandy Price MD  03/23/25 1576

## 2025-03-23 NOTE — DISCHARGE INSTRUCTIONS
Follow-up with primary care, GI, and comprehensive spine as needed for further care. Contact info provided below if needed.  Use over the counter medications as stated on the bottle as needed for pain control.  Take your new medications as prescribed as needed for nausea.   Return to the ED with new or worsening symptoms.

## 2025-05-04 ENCOUNTER — APPOINTMENT (EMERGENCY)
Dept: RADIOLOGY | Facility: HOSPITAL | Age: 40
End: 2025-05-04
Payer: COMMERCIAL

## 2025-05-04 ENCOUNTER — HOSPITAL ENCOUNTER (EMERGENCY)
Facility: HOSPITAL | Age: 40
Discharge: HOME | End: 2025-05-05
Attending: STUDENT IN AN ORGANIZED HEALTH CARE EDUCATION/TRAINING PROGRAM
Payer: COMMERCIAL

## 2025-05-04 DIAGNOSIS — R10.9 ABDOMINAL PAIN: Primary | ICD-10-CM

## 2025-05-04 LAB
ALBUMIN SERPL BCG-MCNC: 4.4 G/DL (ref 3.5–5)
ALP SERPL-CCNC: 117 U/L (ref 34–104)
ALT SERPL W P-5'-P-CCNC: 105 U/L (ref 7–52)
ANION GAP SERPL CALCULATED.3IONS-SCNC: 10 MMOL/L (ref 4–13)
AST SERPL W P-5'-P-CCNC: 47 U/L (ref 13–39)
BACTERIA UR QL AUTO: NORMAL /HPF
BASOPHILS # BLD AUTO: 0.04 THOUSANDS/ÂΜL (ref 0–0.1)
BASOPHILS NFR BLD AUTO: 0 % (ref 0–1)
BILIRUB SERPL-MCNC: 0.58 MG/DL (ref 0.2–1)
BILIRUB UR QL STRIP: NEGATIVE
BUN SERPL-MCNC: 12 MG/DL (ref 5–25)
CALCIUM SERPL-MCNC: 10.6 MG/DL (ref 8.4–10.2)
CHLORIDE SERPL-SCNC: 105 MMOL/L (ref 96–108)
CLARITY UR: CLEAR
CO2 SERPL-SCNC: 24 MMOL/L (ref 21–32)
COLOR UR: YELLOW
CREAT SERPL-MCNC: 1.1 MG/DL (ref 0.6–1.3)
EOSINOPHIL # BLD AUTO: 0.22 THOUSAND/ÂΜL (ref 0–0.61)
EOSINOPHIL NFR BLD AUTO: 2 % (ref 0–6)
ERYTHROCYTE [DISTWIDTH] IN BLOOD BY AUTOMATED COUNT: 13.5 % (ref 11.6–15.1)
GFR SERPL CREATININE-BSD FRML MDRD: 85 ML/MIN/1.73SQ M
GLUCOSE SERPL-MCNC: 116 MG/DL (ref 65–140)
GLUCOSE UR STRIP-MCNC: NEGATIVE MG/DL
HCT VFR BLD AUTO: 42.4 % (ref 36.5–49.3)
HGB BLD-MCNC: 15 G/DL (ref 12–17)
HGB UR QL STRIP.AUTO: NEGATIVE
IMM GRANULOCYTES # BLD AUTO: 0.17 THOUSAND/UL (ref 0–0.2)
IMM GRANULOCYTES NFR BLD AUTO: 1 % (ref 0–2)
KETONES UR STRIP-MCNC: NEGATIVE MG/DL
LEUKOCYTE ESTERASE UR QL STRIP: NEGATIVE
LIPASE SERPL-CCNC: 81 U/L (ref 11–82)
LYMPHOCYTES # BLD AUTO: 3.43 THOUSANDS/ÂΜL (ref 0.6–4.47)
LYMPHOCYTES NFR BLD AUTO: 28 % (ref 14–44)
MCH RBC QN AUTO: 31.1 PG (ref 26.8–34.3)
MCHC RBC AUTO-ENTMCNC: 35.4 G/DL (ref 31.4–37.4)
MCV RBC AUTO: 88 FL (ref 82–98)
MONOCYTES # BLD AUTO: 0.94 THOUSAND/ÂΜL (ref 0.17–1.22)
MONOCYTES NFR BLD AUTO: 8 % (ref 4–12)
NEUTROPHILS # BLD AUTO: 7.38 THOUSANDS/ÂΜL (ref 1.85–7.62)
NEUTS SEG NFR BLD AUTO: 61 % (ref 43–75)
NITRITE UR QL STRIP: NEGATIVE
NON-SQ EPI CELLS URNS QL MICRO: NORMAL /HPF
NRBC BLD AUTO-RTO: 0 /100 WBCS
PH UR STRIP.AUTO: 6.5 [PH]
PLATELET # BLD AUTO: 236 THOUSANDS/UL (ref 149–390)
PMV BLD AUTO: 9.7 FL (ref 8.9–12.7)
POTASSIUM SERPL-SCNC: 4.2 MMOL/L (ref 3.5–5.3)
PROT SERPL-MCNC: 7.6 G/DL (ref 6.4–8.4)
PROT UR STRIP-MCNC: ABNORMAL MG/DL
RBC # BLD AUTO: 4.82 MILLION/UL (ref 3.88–5.62)
RBC #/AREA URNS AUTO: NORMAL /HPF
SODIUM SERPL-SCNC: 139 MMOL/L (ref 135–147)
SP GR UR STRIP.AUTO: 1.01 (ref 1–1.03)
UROBILINOGEN UR STRIP-ACNC: <2 MG/DL
WBC # BLD AUTO: 12.18 THOUSAND/UL (ref 4.31–10.16)
WBC #/AREA URNS AUTO: NORMAL /HPF

## 2025-05-04 PROCEDURE — 99285 EMERGENCY DEPT VISIT HI MDM: CPT | Performed by: STUDENT IN AN ORGANIZED HEALTH CARE EDUCATION/TRAINING PROGRAM

## 2025-05-04 PROCEDURE — 83690 ASSAY OF LIPASE: CPT | Performed by: STUDENT IN AN ORGANIZED HEALTH CARE EDUCATION/TRAINING PROGRAM

## 2025-05-04 PROCEDURE — 74177 CT ABD & PELVIS W/CONTRAST: CPT

## 2025-05-04 PROCEDURE — 80053 COMPREHEN METABOLIC PANEL: CPT | Performed by: STUDENT IN AN ORGANIZED HEALTH CARE EDUCATION/TRAINING PROGRAM

## 2025-05-04 PROCEDURE — 85025 COMPLETE CBC W/AUTO DIFF WBC: CPT | Performed by: STUDENT IN AN ORGANIZED HEALTH CARE EDUCATION/TRAINING PROGRAM

## 2025-05-04 PROCEDURE — 96374 THER/PROPH/DIAG INJ IV PUSH: CPT

## 2025-05-04 PROCEDURE — 99284 EMERGENCY DEPT VISIT MOD MDM: CPT

## 2025-05-04 PROCEDURE — 81001 URINALYSIS AUTO W/SCOPE: CPT | Performed by: STUDENT IN AN ORGANIZED HEALTH CARE EDUCATION/TRAINING PROGRAM

## 2025-05-04 PROCEDURE — 36415 COLL VENOUS BLD VENIPUNCTURE: CPT | Performed by: STUDENT IN AN ORGANIZED HEALTH CARE EDUCATION/TRAINING PROGRAM

## 2025-05-04 RX ORDER — KETOROLAC TROMETHAMINE 30 MG/ML
15 INJECTION, SOLUTION INTRAMUSCULAR; INTRAVENOUS ONCE
Status: COMPLETED | OUTPATIENT
Start: 2025-05-04 | End: 2025-05-04

## 2025-05-04 RX ADMIN — KETOROLAC TROMETHAMINE 15 MG: 30 INJECTION, SOLUTION INTRAMUSCULAR; INTRAVENOUS at 21:05

## 2025-05-04 RX ADMIN — IOHEXOL 100 ML: 350 INJECTION, SOLUTION INTRAVENOUS at 22:04

## 2025-05-05 VITALS
TEMPERATURE: 98 F | SYSTOLIC BLOOD PRESSURE: 154 MMHG | OXYGEN SATURATION: 98 % | RESPIRATION RATE: 18 BRPM | DIASTOLIC BLOOD PRESSURE: 88 MMHG | WEIGHT: 315 LBS | BODY MASS INDEX: 46.17 KG/M2 | HEART RATE: 82 BPM

## 2025-05-05 RX ORDER — DICYCLOMINE HCL 20 MG
20 TABLET ORAL 2 TIMES DAILY
Qty: 20 TABLET | Refills: 0 | Status: SHIPPED | OUTPATIENT
Start: 2025-05-05

## 2025-05-05 NOTE — ED CARE HANDOFF
Emergency Department Sign Out Note        Sign out and transfer of care from Dr Nieto. See Separate Emergency Department note.     The patient, Elver Hanks, was evaluated by the previous provider for abdominal pain.    Workup Completed:  Labs complete. CT pending    ED Course / Workup Pending (followup):  Negative CT  Patient already has follow up with GI and plans for a scope in a month. Return precautions discussed and patient discharged.        No data recorded                          ED Course as of 05/05/25 0047   Sun May 04, 2025   2242 Belly pain for three weeks.  CT abd pelvis pending. D/c if negative.     Procedures  Medical Decision Making  Amount and/or Complexity of Data Reviewed  Labs: ordered.  Radiology: ordered.    Risk  Prescription drug management.            Disposition  Final diagnoses:   Abdominal pain     Time reflects when diagnosis was documented in both MDM as applicable and the Disposition within this note       Time User Action Codes Description Comment    5/5/2025 12:02 AM Yonathan Sanchez Add [R10.9] Abdominal pain           ED Disposition       ED Disposition   Discharge    Condition   Stable    Date/Time   Mon May 5, 2025 12:02 AM    Comment   Elver Hanks discharge to home/self care.                   Follow-up Information       Follow up With Specialties Details Why Contact Info Additional Information    [facility] Family Medicine Call  To schedule an appointment to establish care with a primary care provider [address] [place] [id number] [phone] [facility], [address] [place] [address], [phone]    [facility] Emergency Department Emergency Medicine Go to  If symptoms worsen, As needed [address] [facility] [address] [phone] [facility], [facility] Emergency Department, [address], [place], [address]           Discharge Medication List as of 5/5/2025 12:02 AM        CONTINUE these medications which have NOT CHANGED    Details   acetaminophen (TYLENOL) 650 mg CR tablet Take 1 tablet (650 mg total) by mouth every 8 (eight) hours as needed for mild pain, Starting Wed 1/22/2025, Normal      benzonatate (TESSALON PERLES) 100 mg capsule Take 1 capsule (100 mg total) by mouth every 8 (eight) hours, Starting Wed 12/11/2024, Normal      buPROPion (WELLBUTRIN XL) 150 mg 24 hr tablet 450 mg, Starting Fri 4/12/2024, Historical Med      Cholecalciferol 50 MCG (2000 UT) TABS 50 mcg, Starting Fri 7/14/2023, Historical Med      dicyclomine (BENTYL) 20 mg tablet Take 1 tablet (20 mg total) by mouth 4 (four) times a day as needed (abdominal discomfort), Starting Tue 4/30/2024, Normal      escitalopram (LEXAPRO) 20 mg tablet Take 20 mg by mouth daily, Historical Med      famotidine (PEPCID) 20 mg tablet Take 20 mg by mouth, Historical Med      fluticasone (FLONASE) 50 mcg/act nasal spray 1 spray into each nostril daily, Starting Thu 11/16/2023, Normal      guaiFENesin (MUCINEX) 600 mg 12 hr tablet Take 2 tablets (1,200 mg total) by mouth every 12 (twelve) hours, Starting Wed 1/22/2025, Normal      hydrOXYzine HCL (ATARAX) 25 mg tablet Historical Med      !! ibuprofen (MOTRIN) 600 mg tablet Historical Med      !! ibuprofen (MOTRIN) 600 mg tablet Take 1 tablet (600 mg total) by mouth every 6 (six) hours as needed for mild pain or fever, Starting Wed 1/22/2025, Normal      Magnesium Oxide 240 MG PACK Take 420 mg by mouth, Historical Med      melatonin 3 mg 12 mg, Starting Fri 4/12/2024, Historical Med      methocarbamol (ROBAXIN) 500 mg tablet Take 500 mg by mouth, Historical Med      Nutritional Supplements (VITAMIN D BOOSTER PO) Take by mouth in the morning, Historical Med      !! ondansetron (ZOFRAN) 4 mg tablet Take 1 tablet (4 mg total) by mouth every 8 (eight) hours as needed for nausea or vomiting, Starting Mon 2/17/2025, Normal       !! ondansetron (ZOFRAN) 4 mg tablet Take 1 tablet (4 mg total) by mouth every 6 (six) hours as needed for nausea, Starting Sun 3/23/2025, Normal      ondansetron (ZOFRAN-ODT) 4 mg disintegrating tablet Take 1 tablet (4 mg total) by mouth every 6 (six) hours as needed for nausea or vomiting, Starting Wed 11/20/2024, Normal      pantoprazole (PROTONIX) 40 mg tablet Take 1 tablet (40 mg total) by mouth daily, Starting Tue 4/30/2024, Normal      prazosin (MINIPRESS) 2 mg capsule 2 mg, Starting Fri 4/12/2024, Historical Med      pseudoephedrine (SUDAFED) 120 MG 12 hr tablet Take 1 tablet (120 mg total) by mouth 2 (two) times a day, Starting Wed 1/22/2025, Normal      sildenafil (VIAGRA) 50 MG tablet TAKE ONE TABLET BY MOUTH AS DIRECTED FOR ERECTILE DYSFUNCTION 1 HR PRIOR TO SEXUAL ACTIVITY. DO NOT TAKE MORE THAN ONE DOSE IN 24 HOURS., Historical Med      !! topiramate (TOPAMAX) 25 mg tablet Take 50 mg by mouth, Starting Fri 2/16/2024, Historical Med      !! topiramate (TOPAMAX) 50 MG tablet Historical Med      traZODone (DESYREL) 50 mg tablet Take 1 tablet by mouth daily at bedtime as needed, Starting Tue 1/10/2023, Until Thu 11/30/2023 at 2359, Historical Med       !! - Potential duplicate medications found. Please discuss with provider.        No discharge procedures on file.       ED Provider  Electronically Signed by     Yonathan Sanchez MD  05/05/25 0049

## 2025-05-05 NOTE — ED PROVIDER NOTES
"  ED Disposition       None          Assessment & Plan       Medical Decision Making  Patient is a 39 y.o. male who presents to the ED for lower abdominal pain. Abdominal exam without peritoneal signs. No evidence of acute abdomen at this time. Well appearing.    Differential diagnosis includes: Colitis, diverticulitis, cystitis.  Low suspicion for acute hepatobiliary disease (includng acute cholecystitis), acute pancreatitis, PUD (including perforation), acute infectious processes (pneumonia, hepatitis, pyelonephritis), acute appendicitis, vascular catastrophe, bowel obstruction or viscus perforation. Presentation not consistent with other acute, emergent causes of abdominal pain at this time.    Plan: labs, UA, CT AP, pain control, serial reassessment            Amount and/or Complexity of Data Reviewed  Labs: ordered.  Radiology: ordered.    Risk  Prescription drug management.        ED Course as of 05/04/25 2244   Sun May 04, 2025   2242 Pt signed out to Dr Sanchez. Pending CT       Medications   ketorolac (TORADOL) injection 15 mg (15 mg Intravenous Given 5/4/25 2105)   iohexol (OMNIPAQUE) 350 MG/ML injection (MULTI-DOSE) 100 mL (100 mL Intravenous Given 5/4/25 2204)       ED Risk Strat Scores                    No data recorded        SBIRT 22yo+      Flowsheet Row Most Recent Value   Initial Alcohol Screen: US AUDIT-C     1. How often do you have a drink containing alcohol? 0 Filed at: 05/04/2025 2038   3a. Male UNDER 65: How often do you have five or more drinks on one occasion? 0 Filed at: 05/04/2025 2038   Audit-C Score 0 Filed at: 05/04/2025 2038   REUBEN: How many times in the past year have you...    Used an illegal drug or used a prescription medication for non-medical reasons? Never Filed at: 05/04/2025 2038                            History of Present Illness       Chief Complaint   Patient presents with    Abdominal Pain     States pain lower abdomen for 3 weeks. Denies N/V. States \" move my bowels " "but hardly anything comes out \". Denies problem urinating. Pain worse after eating or drinking.        Past Medical History:   Diagnosis Date    Migraine     Psychiatric disorder     PTSD      Past Surgical History:   Procedure Laterality Date    ANTERIOR CRUCIATE LIGAMENT REPAIR Left     APPENDECTOMY      SHOULDER SURGERY Left       History reviewed. No pertinent family history.   Social History     Tobacco Use    Smoking status: Former     Types: Cigarettes    Smokeless tobacco: Never   Vaping Use    Vaping status: Never Used   Substance Use Topics    Alcohol use: Never    Drug use: Never      E-Cigarette/Vaping    E-Cigarette Use Never User       E-Cigarette/Vaping Substances    Nicotine No     THC No     CBD No     Flavoring No     Other No     Unknown No       I have reviewed and agree with the history as documented.     39-year-old male, past medical history including migraines, who presents the emergency room for abdominal pain.  Has been present for about 3 weeks.  Mainly lower abdominal pain.  [facility] been found.  He is scheduled for an MRI (unable to find records for this).  However, he states he cannot wait because the pain is getting worse. No associated symptoms.  No nausea no fevers.  No other complaints or concerns.      Abdominal Pain      Review of Systems   Gastrointestinal:  Positive for abdominal pain.   All other systems reviewed and are negative.          Objective       ED Triage Vitals [05/04/25 2036]   Temperature Pulse Blood Pressure Respirations SpO2 Patient Position - Orthostatic VS   98 °F (36.7 °C) 98 (!) 175/92 20 96 % Sitting      Temp Source Heart Rate Source BP Location FiO2 (%) Pain Score    Tympanic Monitor Left arm -- 10 - Worst Possible Pain      Vitals      Date and Time Temp Pulse SpO2 Resp BP Pain Score FACES Pain Rating User   05/04/25 2105 -- -- -- -- -- 10 - Worst Possible Pain --    05/04/25 2036 98 °F (36.7 °C) 98 96 % 20 " 175/92 10 - Worst Possible Pain -- SW            Physical Exam  Vitals and nursing note reviewed.   Constitutional:       General: He is not in acute distress.     Appearance: He is well-developed. He is not diaphoretic.   HENT:      Head: Normocephalic and atraumatic.      Right Ear: External ear normal.      Left Ear: External ear normal.      Nose: Nose normal.   Eyes:      General: Lids are normal. No scleral icterus.  Cardiovascular:      Rate and Rhythm: Normal rate and regular rhythm.      Heart sounds: Normal heart sounds. No murmur heard.     No friction rub. No gallop.   Pulmonary:      Effort: Pulmonary effort is normal. No respiratory distress.      Breath sounds: Normal breath sounds. No wheezing or rales.   Abdominal:      Palpations: Abdomen is soft.      Tenderness: There is abdominal tenderness in the right lower quadrant, suprapubic area and left lower quadrant. There is no guarding or rebound.   Musculoskeletal:         General: No deformity. Normal range of motion.      Cervical back: Normal range of motion and neck supple.   Skin:     General: Skin is warm and dry.   Neurological:      General: No focal deficit present.      Mental Status: He is alert.   Psychiatric:         Mood and Affect: Mood normal.         Behavior: Behavior normal.         Results Reviewed       Procedure Component Value Units Date/Time    UA w Reflex to Microscopic w Reflex to Culture [[id number]]     Lab Status: No result Specimen: Urine     Comprehensive metabolic panel [[id number]]  (Abnormal) Collected: 05/04/25 2105    Lab Status: Final result Specimen: Blood from Arm, Left Updated: 05/04/25 2132     Sodium 139 mmol/L      Potassium 4.2 mmol/L      Chloride 105 mmol/L      CO2 24 mmol/L      ANION GAP 10 mmol/L      BUN 12 mg/dL      Creatinine 1.10 mg/dL      Glucose 116 mg/dL      Calcium 10.6 mg/dL      AST 47 U/L       U/L      Alkaline Phosphatase 117 U/L      Total Protein 7.6 g/dL      Albumin 4.4 g/dL       Total Bilirubin 0.58 mg/dL      eGFR 85 ml/min/1.73sq m     Narrative:      [employer] Kid[employer] guidelines for Chronic Kidney Disease (CKD):     Stage 1 with normal or high GFR (GFR > 90 mL/min/1.73 square meters)    Stage 2 Mild CKD (GFR = 60-89 mL/min/1.73 square meters)    Stage 3A Moderate CKD (GFR = 45-59 mL/min/1.73 square meters)    Stage 3B Moderate CKD (GFR = 30-44 mL/min/1.73 square meters)    Stage 4 Severe CKD (GFR = 15-29 mL/min/1.73 square meters)    Stage 5 End Stage CKD (GFR <15 mL/min/1.73 square meters)  Note: GFR calculation is accurate only with a steady state creatinine    Lipase [[id number]]  (Normal) Collected: 25    Lab Status: Final result Specimen: Blood from Arm, Left Updated: 25     Lipase 81 u/L     CBC and differential [[id number]]  (Abnormal) Collected: 25    Lab Status: Final result Specimen: Blood from Arm, Left Updated: 25     WBC 12.18 Thousand/uL      RBC 4.82 Million/uL      Hemoglobin 15.0 g/dL      Hematocrit 42.4 %      MCV 88 fL      MCH 31.1 pg      MCHC 35.4 g/dL      RDW 13.5 %      MPV 9.7 fL      Platelets 236 Thousands/uL      nRBC 0 /100 WBCs      Segmented % 61 %      Immature Grans % 1 %      Lymphocytes % 28 %      Monocytes % 8 %      Eosinophils Relative 2 %      Basophils Relative 0 %      Absolute Neutrophils 7.38 Thousands/µL      Absolute Immature Grans 0.17 Thousand/uL      Absolute Lymphocytes 3.43 Thousands/µL      Absolute Monocytes 0.94 Thousand/µL      Eosinophils Absolute 0.22 Thousand/µL      Basophils Absolute 0.04 Thousands/µL             CT abdomen pelvis w contrast    (Results Pending)       Procedures    ED Medication and Procedure Management   Prior to Admission Medications   Prescriptions Last Dose Informant Patient Reported? Taking?   Cholecalciferol 50 MCG (2000) TABS   Yes No   Si mcg   Magnesium Oxide 240 MG PACK   Yes No   Sig: Take 420 mg by mouth   Nutritional  Supplements (VITAMIN D BOOSTER PO)   Yes No   Sig: Take by mouth in the morning   acetaminophen (TYLENOL) 650 mg CR tablet   No No   Sig: Take 1 tablet (650 mg total) by mouth every 8 (eight) hours as needed for mild pain   benzonatate (TESSALON PERLES) 100 mg capsule   No No   Sig: Take 1 capsule (100 mg total) by mouth every 8 (eight) hours   buPROPion (WELLBUTRIN XL) 150 mg 24 hr tablet   Yes No   Si mg   dicyclomine (BENTYL) 20 mg tablet   No No   Sig: Take 1 tablet (20 mg total) by mouth 4 (four) times a day as needed (abdominal discomfort)   escitalopram (LEXAPRO) 20 mg tablet   Yes No   Sig: Take 20 mg by mouth daily   famotidine (PEPCID) 20 mg tablet   Yes No   Sig: Take 20 mg by mouth   fluticasone (FLONASE) 50 mcg/act nasal spray   No No   Si spray into each nostril daily   guaiFENesin (MUCINEX) 600 mg 12 hr tablet   No No   Sig: Take 2 tablets (1,200 mg total) by mouth every 12 (twelve) hours   hydrOXYzine HCL (ATARAX) 25 mg tablet   Yes No   ibuprofen (MOTRIN) 600 mg tablet   Yes No   ibuprofen (MOTRIN) 600 mg tablet   No No   Sig: Take 1 tablet (600 mg total) by mouth every 6 (six) hours as needed for mild pain or fever   melatonin 3 mg   Yes No   Si mg   methocarbamol (ROBAXIN) 500 mg tablet   Yes No   Sig: Take 500 mg by mouth   ondansetron (ZOFRAN) 4 mg tablet   No No   Sig: Take 1 tablet (4 mg total) by mouth every 8 (eight) hours as needed for nausea or vomiting   ondansetron (ZOFRAN) 4 mg tablet   No No   Sig: Take 1 tablet (4 mg total) by mouth every 6 (six) hours as needed for nausea   ondansetron (ZOFRAN-ODT) 4 mg disintegrating tablet   No No   Sig: Take 1 tablet (4 mg total) by mouth every 6 (six) hours as needed for nausea or vomiting   pantoprazole (PROTONIX) 40 mg tablet   No No   Sig: Take 1 tablet (40 mg total) by mouth daily   prazosin (MINIPRESS) 2 mg capsule   Yes No   Si mg   pseudoephedrine (SUDAFED) 120 MG 12 hr tablet   No No   Sig: Take 1 tablet (120 mg total)  by mouth 2 (two) times a day   sildenafil (VIAGRA) 50 MG tablet   Yes No   Sig: TAKE ONE TABLET BY MOUTH AS DIRECTED FOR ERECTILE DYSFUNCTION 1 HR PRIOR TO SEXUAL ACTIVITY. DO NOT TAKE MORE THAN ONE DOSE IN 24 HOURS.   topiramate (TOPAMAX) 25 mg tablet   Yes No   Sig: Take 50 mg by mouth   Patient not taking: Reported on 2/17/2025   topiramate (TOPAMAX) 50 MG tablet   Yes No   traZODone (DESYREL) 50 mg tablet   Yes No   Sig: Take 1 tablet by mouth daily at bedtime as needed      Facility-Administered Medications: None     Patient's Medications   Discharge Prescriptions    No medications on file     No discharge procedures on file.  ED SEPSIS DOCUMENTATION            Giancarlo Nieto DO  05/04/25 4047

## 2025-05-05 NOTE — DISCHARGE INSTRUCTIONS
At this time your lab workup and CAT scan were otherwise unremarkable.  Please follow-up with primary care doctor.  Call [facility] at their office below if you do not have a primary care doctor to follow-up with.  Return to ER if you develop fever or worsening symptoms.

## 2025-06-16 ENCOUNTER — HOSPITAL ENCOUNTER (EMERGENCY)
Facility: HOSPITAL | Age: 40
Discharge: HOME | End: 2025-06-16
Attending: EMERGENCY MEDICINE
Payer: COMMERCIAL

## 2025-06-16 ENCOUNTER — APPOINTMENT (EMERGENCY)
Dept: RADIOLOGY | Facility: HOSPITAL | Age: 40
End: 2025-06-16
Payer: COMMERCIAL

## 2025-06-16 VITALS
TEMPERATURE: 98.5 F | SYSTOLIC BLOOD PRESSURE: 159 MMHG | OXYGEN SATURATION: 94 % | HEART RATE: 99 BPM | DIASTOLIC BLOOD PRESSURE: 101 MMHG | RESPIRATION RATE: 22 BRPM

## 2025-06-16 DIAGNOSIS — M25.521 RIGHT ELBOW PAIN: Primary | ICD-10-CM

## 2025-06-16 PROCEDURE — 99284 EMERGENCY DEPT VISIT MOD MDM: CPT | Performed by: EMERGENCY MEDICINE

## 2025-06-16 PROCEDURE — 99283 EMERGENCY DEPT VISIT LOW MDM: CPT

## 2025-06-16 PROCEDURE — 73080 X-RAY EXAM OF ELBOW: CPT

## 2025-06-16 PROCEDURE — 73030 X-RAY EXAM OF SHOULDER: CPT

## 2025-06-16 RX ORDER — IBUPROFEN 400 MG/1
400 TABLET, FILM COATED ORAL ONCE
Status: COMPLETED | OUTPATIENT
Start: 2025-06-16 | End: 2025-06-16

## 2025-06-16 RX ORDER — ACETAMINOPHEN 325 MG/1
975 TABLET ORAL ONCE
Status: COMPLETED | OUTPATIENT
Start: 2025-06-16 | End: 2025-06-16

## 2025-06-16 RX ADMIN — ACETAMINOPHEN 975 MG: 325 TABLET, FILM COATED ORAL at 18:45

## 2025-06-16 RX ADMIN — IBUPROFEN 400 MG: 400 TABLET, FILM COATED ORAL at 18:45

## 2025-06-16 RX ADMIN — DICLOFENAC SODIUM 2 G: 10 GEL TOPICAL at 19:14

## 2025-06-16 NOTE — DISCHARGE INSTRUCTIONS
Follow-up with orthopedics as needed for further care. Contact info provided below if needed.  Use over the counter medications as stated on the bottle as needed for pain control.  - Tylenol  - Ibuprofen  Use ace wrap or sleeve for compression.   Return to the ED with new or worsening symptoms.

## 2025-06-16 NOTE — ED PROVIDER NOTES
Time reflects when diagnosis was documented in both MDM as applicable and the Disposition within this note       Time User Action Codes Description Comment    6/16/2025  7:14 PM Brandy Price Add [M25.521] Right elbow pain           ED Disposition       ED Disposition   Discharge    Condition   Stable    Date/Time   Mon Jun 16, 2025  7:13 PM    Comment   Elver Hanks discharge to home/self care.                   Assessment & Plan       Medical Decision Making  Pt is a 40yo M who presents with elbow pain.     Differential diagnosis to include but not limited to fracture, soft tissue injury, medial epicondylitis.  Likely epicondylitis as atraumatic and based on exam. Will perform XR to r/o osseous abnormality. Will treat symptomatically.  See ED course for results and details.    Plan to discharge pt with f/u to orthopedics. Discussed returning the ED with new or worsening of symptoms. Discussed use of over the counter medications as stated on the bottle as needed for pain. Pt expressed understanding of discharge instructions, return precautions, and medication instructions and is stable for discharge at this time. All questions were answered and pt was discharged without incident.         Amount and/or Complexity of Data Reviewed  Radiology: ordered. Decision-making details documented in ED Course.    Risk  OTC drugs.  Prescription drug management.        ED Course as of 06/16/25 1925 Mon Jun 16, 2025 1912 XR shoulder 2+ views RIGHT  No acute findings on wet read.        Medications   acetaminophen (TYLENOL) tablet 975 mg (975 mg Oral Given 6/16/25 1845)   ibuprofen (MOTRIN) tablet 400 mg (400 mg Oral Given 6/16/25 1845)   Diclofenac Sodium (VOLTAREN) 1 % topical gel 2 g (2 g Topical Given 6/16/25 1914)       ED Risk Strat Scores                    No data recorded                            History of Present Illness       Chief Complaint   Patient presents with    Arm Pain     Pain in R elbow since last  night, can't lift shoulder up on that side either without pain, denies trauma       Past Medical History[1]   Past Surgical History[2]   Family History[3]   Social History[4]   E-Cigarette/Vaping    E-Cigarette Use Never User       E-Cigarette/Vaping Substances    Nicotine No     THC No     CBD No     Flavoring No     Other No     Unknown No       I have reviewed and agree with the history as documented.     Pt is a 40yo M who presents for elbow pain.  Patient reports that starting yesterday he began with medial right elbow pain.  Patient reports it is worsened with movement.  Patient also reports pain in the shoulder with movement, however the elbow pain is worse.  Patient did not take anything at home for the pain.  Patient denies any trauma or injury to the area, however does report he is currently moving and therefore has been lifting a lot of things.  Patient denies any previous surgery to this arm.  Patient is right-hand dominant.          Objective       ED Triage Vitals [06/16/25 1809]   Temperature Pulse Blood Pressure Respirations SpO2 Patient Position - Orthostatic VS   98.5 °F (36.9 °C) 99 (!) 159/101 22 94 % Sitting      Temp Source Heart Rate Source BP Location FiO2 (%) Pain Score    Tympanic Monitor Left arm -- 10 - Worst Possible Pain      Vitals      Date and Time Temp Pulse SpO2 Resp BP Pain Score FACES Pain Rating User   06/16/25 1809 98.5 °F (36.9 °C) 99 94 % 22 159/101 10 - Worst Possible Pain -- LS            Physical Exam  Vitals reviewed.   Constitutional:       General: He is not in acute distress.     Appearance: He is well-developed. He is obese. He is not toxic-appearing or diaphoretic.   HENT:      Head: Normocephalic and atraumatic.      Right Ear: External ear normal.      Left Ear: External ear normal.      Nose: Nose normal.      Mouth/Throat:      Pharynx: Oropharynx is clear.     Eyes:      Extraocular Movements: Extraocular movements intact.      Conjunctiva/sclera: Conjunctivae  normal.      Pupils: Pupils are equal, round, and reactive to light.       Cardiovascular:      Rate and Rhythm: Normal rate and regular rhythm.      Heart sounds: Normal heart sounds.   Pulmonary:      Effort: Pulmonary effort is normal. No respiratory distress.      Breath sounds: Normal breath sounds.   Abdominal:      General: Bowel sounds are normal. There is no distension.      Palpations: Abdomen is soft.      Tenderness: There is no abdominal tenderness.     Musculoskeletal:      Right shoulder: Tenderness (mild, anterior) present. No swelling or deformity. Decreased range of motion (2/2 pain).      Right elbow: No swelling. Decreased range of motion (2/2 pain). Tenderness present in medial epicondyle.      Cervical back: Normal range of motion and neck supple.      Comments: CMS intact distally including finger adduction, finger abduction, thumb opposition, and wrist extension     Skin:     General: Skin is warm and dry.      Capillary Refill: Capillary refill takes less than 2 seconds.      Coloration: Skin is not pale.      Findings: No erythema or rash.     Neurological:      Mental Status: He is alert and oriented to person, place, and time.      Cranial Nerves: No cranial nerve deficit.      Sensory: No sensory deficit.      Coordination: Coordination normal.     Psychiatric:         Speech: Speech normal.         Behavior: Behavior is cooperative.         Results Reviewed       None            XR shoulder 2+ views RIGHT    (Results Pending)   XR elbow 3+ vw RIGHT    (Results Pending)       Procedures    ED Medication and Procedure Management   Prior to Admission Medications   Prescriptions Last Dose Informant Patient Reported? Taking?   Cholecalciferol 50 MCG (2000) TABS   Yes No   Si mcg   Magnesium Oxide 240 MG PACK   Yes No   Sig: Take 420 mg by mouth   Nutritional Supplements (VITAMIN D BOOSTER PO)   Yes No   Sig: Take by mouth in the morning   acetaminophen (TYLENOL) 650 mg CR tablet   No  No   Sig: Take 1 tablet (650 mg total) by mouth every 8 (eight) hours as needed for mild pain   benzonatate (TESSALON PERLES) 100 mg capsule   No No   Sig: Take 1 capsule (100 mg total) by mouth every 8 (eight) hours   buPROPion (WELLBUTRIN XL) 150 mg 24 hr tablet   Yes No   Si mg   dicyclomine (BENTYL) 20 mg tablet   No No   Sig: Take 1 tablet (20 mg total) by mouth 4 (four) times a day as needed (abdominal discomfort)   dicyclomine (BENTYL) 20 mg tablet   No No   Sig: Take 1 tablet (20 mg total) by mouth 2 (two) times a day   escitalopram (LEXAPRO) 20 mg tablet   Yes No   Sig: Take 20 mg by mouth daily   famotidine (PEPCID) 20 mg tablet   Yes No   Sig: Take 20 mg by mouth   fluticasone (FLONASE) 50 mcg/act nasal spray   No No   Si spray into each nostril daily   guaiFENesin (MUCINEX) 600 mg 12 hr tablet   No No   Sig: Take 2 tablets (1,200 mg total) by mouth every 12 (twelve) hours   hydrOXYzine HCL (ATARAX) 25 mg tablet   Yes No   ibuprofen (MOTRIN) 600 mg tablet   Yes No   ibuprofen (MOTRIN) 600 mg tablet   No No   Sig: Take 1 tablet (600 mg total) by mouth every 6 (six) hours as needed for mild pain or fever   melatonin 3 mg   Yes No   Si mg   methocarbamol (ROBAXIN) 500 mg tablet   Yes No   Sig: Take 500 mg by mouth   ondansetron (ZOFRAN) 4 mg tablet   No No   Sig: Take 1 tablet (4 mg total) by mouth every 8 (eight) hours as needed for nausea or vomiting   ondansetron (ZOFRAN) 4 mg tablet   No No   Sig: Take 1 tablet (4 mg total) by mouth every 6 (six) hours as needed for nausea   ondansetron (ZOFRAN-ODT) 4 mg disintegrating tablet   No No   Sig: Take 1 tablet (4 mg total) by mouth every 6 (six) hours as needed for nausea or vomiting   pantoprazole (PROTONIX) 40 mg tablet   No No   Sig: Take 1 tablet (40 mg total) by mouth daily   prazosin (MINIPRESS) 2 mg capsule   Yes No   Si mg   pseudoephedrine (SUDAFED) 120 MG 12 hr tablet   No No   Sig: Take 1 tablet (120 mg total) by mouth 2 (two)  times a day   sildenafil (VIAGRA) 50 MG tablet   Yes No   Sig: TAKE ONE TABLET BY MOUTH AS DIRECTED FOR ERECTILE DYSFUNCTION 1 HR PRIOR TO SEXUAL ACTIVITY. DO NOT TAKE MORE THAN ONE DOSE IN 24 HOURS.   topiramate (TOPAMAX) 25 mg tablet   Yes No   Sig: Take 50 mg by mouth   Patient not taking: Reported on 2/17/2025   topiramate (TOPAMAX) 50 MG tablet   Yes No   traZODone (DESYREL) 50 mg tablet   Yes No   Sig: Take 1 tablet by mouth daily at bedtime as needed      Facility-Administered Medications: None     Patient's Medications   Discharge Prescriptions    No medications on file     No discharge procedures on file.  ED SEPSIS DOCUMENTATION   Time reflects when diagnosis was documented in both MDM as applicable and the Disposition within this note       Time User Action Codes Description Comment    6/16/2025  7:14 PM Brandy Price Add [M25.521] Right elbow pain                      [1]   Past Medical History:  Diagnosis Date    Migraine     Psychiatric disorder     PTSD   [2]   Past Surgical History:  Procedure Laterality Date    ANTERIOR CRUCIATE LIGAMENT REPAIR Left     APPENDECTOMY      SHOULDER SURGERY Left    [3] No family history on file.  [4]   Social History  Tobacco Use    Smoking status: Former     Types: Cigarettes    Smokeless tobacco: Never   Vaping Use    Vaping status: Never Used   Substance Use Topics    Alcohol use: Never    Drug use: Never        Brandy Price MD  06/16/25 8342